# Patient Record
Sex: FEMALE | Race: WHITE | NOT HISPANIC OR LATINO | Employment: FULL TIME | URBAN - METROPOLITAN AREA
[De-identification: names, ages, dates, MRNs, and addresses within clinical notes are randomized per-mention and may not be internally consistent; named-entity substitution may affect disease eponyms.]

---

## 2017-09-06 ENCOUNTER — OFFICE VISIT (OUTPATIENT)
Dept: ALLERGY | Facility: CLINIC | Age: 57
End: 2017-09-06
Payer: COMMERCIAL

## 2017-09-06 VITALS
WEIGHT: 142.4 LBS | DIASTOLIC BLOOD PRESSURE: 84 MMHG | SYSTOLIC BLOOD PRESSURE: 122 MMHG | BODY MASS INDEX: 22.88 KG/M2 | HEIGHT: 66 IN | HEART RATE: 74 BPM | OXYGEN SATURATION: 98 %

## 2017-09-06 DIAGNOSIS — K21.9 GASTROESOPHAGEAL REFLUX DISEASE, ESOPHAGITIS PRESENCE NOT SPECIFIED: Primary | ICD-10-CM

## 2017-09-06 PROCEDURE — 99203 OFFICE O/P NEW LOW 30 MIN: CPT | Performed by: ALLERGY & IMMUNOLOGY

## 2017-09-06 RX ORDER — FEXOFENADINE HCL 60 MG/1
TABLET, FILM COATED ORAL PRN
COMMUNITY

## 2017-09-06 RX ORDER — ONDANSETRON 8 MG/1
8 TABLET, FILM COATED ORAL EVERY 8 HOURS PRN
Qty: 9 TABLET | Refills: 0 | COMMUNITY
Start: 2017-09-06

## 2017-09-06 NOTE — PROGRESS NOTES
"Maribel Gavin was seen in the Allergy Clinic at [facility]. The following are my recommendations regarding her GERD    1. Counseling and information provided regarding lifestyle and dietary modifications to manage acid reflux  2. Follow-up with PCP regarding ongoing medication management      Maribel Gavin is a 57 year old White female being seen today in consultation for food allergies. She has had a lot of GI symptoms and is wondering whether specific foods are contributing to these symptoms. Maribel reports symptoms of stomach discomfort or feeling \"icky\" along with chest discomfort and a \"mucusy cough.\" These symptoms occur after eating particularly foods that contain citrus fruits, tomatoes, spices, caffeine, and chocolate. She has since begun eliminating much of these foods from her diet. She has also noted that when she cooks her nose often runs. Maribel denies symptoms of hives or rash, swelling, difficulty swallowing, cough, difficulty breathing, nausea, vomiting, dizziness, or lightheadedness after eating any specific foods. Her diet is varied and she tries to eat a healthy diet full of \"whole foods.\" Maribel's diet includes wheat, dairy, oats, soy, eggs, peanuts, tree nuts, fruits, vegetables, meat, chicken, and fish.     Maribel has progressive osteoarthritis that has been managed by a rheumatologist. About 1 month ago she had an episode of vomiting that lasted 24 hours without diarrhea or other symptoms. She was diagnosed with gastritis and her plaquenil, celebrex, and other medications for her arthritis were discontinued. Maribel is concerned that food allergies may have played a role in causing her gastritis in combination with these medications. She was treated with PPI for 30 days and her symptoms improved but she has since discontinued the PPI.      PAST MEDICAL HISTORY:  Seasonal Allergies    Family History   Problem Relation Age of Onset     Cardiovascular Paternal " Grandmother       of an MI in her 80's; first MI in her 40's     CEREBROVASCULAR DISEASE Paternal Grandmother      Cardiovascular Paternal Uncle      3 uncles with MI's and ASHD     Lipids Father      High chol     Lipids Mother      high chol     Hypertension Paternal Uncle      Past Surgical History:   Procedure Laterality Date     COLONOSCOPY  3/7/2013    Procedure: COLONOSCOPY;  Colonoscopy;  Surgeon: Anupam Lopez MD;  Location: [facility]     COLONOSCOPY N/A 2016    Procedure: COLONOSCOPY;  Surgeon: Anupam Lopez MD;  Location: [facility]     HYSTERECTOMY, VAGINAL  2008    fibroids     SURGICAL HISTORY OF -       Laparoscopic     TONSILLECTOMY         ENVIRONMENTAL HISTORY: [place]. [place] is heated with a forced air and gas furnace. They does have central air conditioning. The patient's bedroom is furnished with hard vy in bedroom, allergen mattress cover and allergen pillowcase cover.  Pets inside the house include None. There is not history of cockroach or mice infestation. There is/are 0 smokers in the house.  The house does not have a damp basement. Currently cleaning basement after finding mold.     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 She lives with her spouse and 10 year old grandson.  Her spouse works as at [employer] modifying wheelchairs.    REVIEW OF SYSTEMS:  General: negative for weight gain. negative for weight loss. negative for changes in sleep.   Eyes: negative for itching. negative for redness. negative for tearing/watering.  Ears: negative for fullness. negative for hearing loss. negative for dizziness.   Nose: negative for snoring.negative for changes in smell. positive  for drainage.   Throat: negative for hoarseness. negative for sore throat. negative for trouble swallowing.   Lungs: negative for shortness of breath.negative for wheezing. negative for sputum  production.   Cardiovascular: negative for chest pain. positive  for swelling of ankles. negative for fast or irregular heartbeat.   Gastrointestinal: positive  for nausea. positive  for heartburn. positive  for acid reflux.   Musculoskeletal: positive  for joint pain. positive  for joint stiffness. positive  for joint swelling.   Neurologic: negative for seizures. negative for fainting. negative for weakness.   Psychiatric: negative for changes in mood. positive  for anxiety.   Endocrine: positive  for cold intolerance. positive  for heat intolerance. negative for tremors.   Hematologic: negative for easy bruising. negative for easy bleeding.  Integumentary: negative for rash. negative for scaling. negative for nail changes.       Current Outpatient Prescriptions:      TOPIRAMATE PO, Take 50 mg by mouth daily, Disp: , Rfl:      diclofenac (VOLTAREN) 1 % GEL, , Disp: , Rfl:      DOCOSAHEXAENOIC ACID PO, , Disp: , Rfl:      polyethylene glycol (MIRALAX) powder, 17 g, Disp: , Rfl:      InFLIXimab (REMICADE IV), , Disp: , Rfl:      Ondansetron HCl (ZOFRAN PO), Take 4 mg by mouth every 8 hours as needed for nausea or vomiting, Disp: , Rfl:      AZATHIOPRINE PO, Take 50 mg by mouth 4 times daily , Disp: , Rfl:      VITAMIN D, CHOLECALCIFEROL, PO, Take 5,000 Units by mouth daily, Disp: , Rfl:      UNABLE TO FIND, MEDICATION NAME: Krill oil, Disp: , Rfl:      eflornithine HCl 13.9 % CREA, Apply daily to chin, Disp: 45 g, Rfl: 11     clindamycin (CLEOCIN T) 1 % external solution, Apply topically 2 times daily, Disp: 180 mL, Rfl: 3     tretinoin (RETIN-A) 0.05 % cream, Spread a pea size amount into affected area topically at bedtime.  Use sunscreen SPF>20., Disp: 135 g, Rfl: 3     budesonide-formoterol (SYMBICORT) 80-4.5 MCG/ACT inhaler, Inhale 2 puffs into the lungs 2 times daily, Disp: 1 Inhaler, Rfl: 5     ipratropium (ATROVENT) 0.06 % nasal spray, Spray 2 sprays into both nostrils 4 times daily as needed for rhinitis,  "Disp: 3 Box, Rfl: 3     fluticasone (FLONASE) 50 MCG/ACT nasal spray, Spray 1-2 sprays into both nostrils daily., Disp: 1 Package, Rfl: 11     budesonide (PULMICORT) 0.5 MG/2ML nebulizer solution, 2 ml in 8 oz [facility] Sinus Rinse with water and salt packet both nostrils twice a day, Disp: 6 Box, Rfl: 1     CALCIUM CARBONATE-VITAMIN D PO, Take  by mouth. 1000 mg C and 1000icu D, Disp: , Rfl:   Immunization History   Administered Date(s) Administered     Influenza (IIV3) 10/30/2008     TDAP Vaccine (Adacel) 03/11/2010     Allergies   Allergen Reactions     Nkda [No Known Drug Allergies]          EXAM:   /84 (BP Location: Left arm, Patient Position: Chair, Cuff Size: Adult Regular)  Pulse 74  Ht 1.685 m (5' 6.34\")  Wt 64.6 kg (142 lb 6.4 oz)  SpO2 98%  BMI 22.75 kg/m2  GENERAL APPEARANCE: alert, cooperative and not in distress  SKIN: no rashes, no lesions  HEAD: atraumatic, normocephalic  EYES: lids and lashes normal, conjunctivae and sclerae clear, pupils equal, round, reactive to light, EOM full and intact  ENT: no scars or lesions, nasal exam showed no discharge, swelling or lesions noted, otoscopy showed external auditory canals clear, tympanic membranes normal, tongue midline and normal, soft palate, uvula, and tonsils normal  NECK: no asymmetry, masses, or scars, supple without significant adenopathy  LUNGS: unlabored respirations, no intercostal retractions or accessory muscle use, clear to auscultation without rales or wheezes  HEART: regular rate and rhythm without murmurs and normal S1 and S2  MUSCULOSKELETAL: no musculoskeletal defects are noted  NEURO: no focal deficits noted  PSYCH: does not appear depressed or anxious    WORKUP: None    ASSESSMENT/PLAN:  Maribel Gavin is a 57 year old female here for evaluation of possible food allergies. Maribel was counseled regarding the signs and symptoms of food allergies vs other adverse reactions to foods including sensitivities, intolerances, acid " reflux, and other immune reactions to foods. Her symptoms are not consistent with an IgE mediated reaction to foods. We discussed that skin prick or IgE testing would not be helpful in diagnosing or managing her current symptoms. Maribel was counseled that her symptoms are consistent with GERD and management consists of lifestyle modifications as well as medications if needed.    1. Counseling and information provided regarding lifestyle and dietary modifications to manage acid reflux  2. Follow-up with PCP regarding ongoing medication management      Norberto Ryder MD  Allergy/Immunology  [facility] and [place], [place]      Chart documentation done in part with Dragon Voice Recognition Software. Although reviewed after completion, some word and grammatical errors may remain.

## 2017-09-06 NOTE — MR AVS SNAPSHOT
After Visit Summary   9/6/2017    Maribel Gavin    MRN: [id number]           Patient Information     Date Of Birth          1960        Visit Information        Provider Department      9/6/2017 1:20 PM Norberto Ryder MD [facility]        Care Instructions    If you have any questions regarding your allergies, asthma, or what we discussed during your visit today please call the allergy clinic or contact us via [employer].    [facility] ([place], [place]): [phone]      Follow-up with your primary care doctor for management of acid reflux        Tips to Control Acid Reflux    To control acid reflux, you ll need to make some basic diet and lifestyle changes. The simple steps outlined below may be all you ll need to ease discomfort.  Watch what you eat    Avoid fatty foods and spicy foods.    Eat fewer acidic foods, such as citrus and tomato-based foods. These can increase symptoms.    Limit drinking alcohol, caffeine, and fizzy beverages. All increase acid reflux.    Try limiting chocolate, peppermint, and spearmint. These can worsen acid reflux in some people.  Watch when you eat    Avoid lying down for 3 hours after eating.    Do not snack before going to bed.  Raise your head  Raising your head and upper body by 4 to 6 inches helps limit reflux when you re lying down. Put blocks under the head of your bed frame to raise it.  Other changes    Lose weight, if you need to    Don t exercise near bedtime    Avoid tight-fitting clothes    Limit aspirin and ibuprofen    Stop smoking   Date Last Reviewed: 7/1/2016 2000-2017 [employer]. [address] [place] [address], [place] [address]. All rights reserved. This information is not intended as a substitute for professional medical care. Always follow your healthcare professional's instructions.        How Acid Reflux Affects Your Throat    Do you have to clear your throat or cough often? Are you hoarse? Do you have trouble  swallowing? If you have these or other throat symptoms, you may have acid reflux. This occurs when stomach acid flows back up and irritates your throat.  Why you have throat symptoms  There are muscles (esophageal sphincters) at both ends of the tube that carries food to your stomach (the esophagus). These muscles relax to let food pass. Then they tighten to keep stomach acid down. When the lower esophageal sphincter (LES) doesn t tighten enough, acid can flow back (reflux) from your stomach into your esophagus. This may cause heartburn. In some cases the upper esophageal sphincter (UES) also doesn t work well. Then acid can travel higher and enter your throat (pharynx). In many cases, this causes throat symptoms.  Common throat symptoms    Need to clear your throat often    Feeling like you re choking    Long-term (chronic) cough    Hoarseness    Trouble swallowing    Feel like you have a lump in your throat    Sour or acid taste    Sore throat that keeps coming back   Date Last Reviewed: 7/1/2016 2000-2017 [employer]. [address] [place] [address]. All rights reserved. This information is not intended as a substitute for professional medical care. Always follow your healthcare professional's instructions.        Discharge Instructions for Gastroesophageal Reflux Disease (GERD)  Gastroesophageal reflux disease (GERD) is a backflow of acid from the stomach into the swallowing tube (esophagus).  Home care  These home care steps can help you manage GERD:    Maintain a healthy weight. Get help to lose any extra pounds.    Avoid lying down after meals.    Avoid eating late at night.    Elevate the head of your bed by 6 inches. You can do this by placing wooden blocks or bed risers under the head of your bed.    Avoid wearing tight-fitting clothes.    Avoid foods that might irritate your stomach, such as the following:    Alcohol    Fat    Chocolate    Caffeine    Spearmint or  peppermint    Talk to your healthcare provider if you are taking any of the following medicines. These medicines can make GERD symptoms worse:    Calcium channel blockers    Theophylline    Anticholinergic medicines, such as oxybutynin and benzatropine    Begin an exercise program. Ask your healthcare provider how to get started. You can benefit from simple activities, such as walking or gardening.    Break the smoking habit. Enroll in a stop-smoking program to improve your chances of success.    Limit alcohol intake to no more than 2 drinks a day.    Take your medicines exactly as directed. Don t skip doses.    Avoid over-the-counter nonsteroidal anti-inflammatory medicines, such as aspirin and ibuprofen, unless recommended by your healthcare provider for certain conditions.     If possible, avoid nitrates (heart medicines, such as nitroglycerin and isosorbide dinitrate ).  Follow-up care  Make a follow-up appointment as directed by our staff.     When to call the healthcare provider  Call your healthcare provider immediately if you have any of the following:    Trouble swallowing    Pain when swallowing    Feeling of food caught in your chest or throat    Pain in the neck, chest, or back    Heartburn that causes you to vomit    Vomiting blood    Black or tarry stools (from digested blood)    More saliva (watering of the mouth) than usual    Weight loss of more than 3% to 5% of your total body weight in a month    Hoarseness or sore throat that won t go away    Choking, coughing, or wheezing   Date Last Reviewed: 7/1/2016 2000-2017 [employer]. [address] [place] [address], [place] [address]. All rights reserved. This information is not intended as a substitute for professional medical care. Always follow your healthcare professional's instructions.        GERD (Adult)    The esophagus is a tube that carries food from the mouth to the stomach. A valve at the lower end of the esophagus prevents stomach  "acid from flowing upward. When this valve doesn't work properly, stomach contents may repeatedly flow back up (reflux) into the esophagus. This is called gastroesophageal reflux disease (GERD). GERD can irritate the esophagus. It can cause problems with swallowing or breathing. In severe cases, GERD can cause recurrent pneumonia or other serious problems.  Symptoms of reflux include burning, pressure or sharp pain in the upper abdomen or mid to lower chest. The pain can spread to the neck, back, or shoulder. There may be belching, an acid taste in the back of the throat, chronic cough, or sore throat or hoarseness. GERD symptoms often occur during the day after a big meal. They can also occur at night when lying down.   Home care  Lifestyle changes can help reduce symptoms. If needed, medicines may be prescribed. Symptoms often improve with treatment, but if treatment is stopped, the symptoms often return after a few months. So most persons with GERD will need to continue treatment.  Lifestyle changes    Limit or avoid fatty, fried, and spicy foods, as well as coffee, chocolate, mint, and foods with high acid content such as tomatoes and citrus fruit and juices (orange, grapefruit, lemon).    Don t eat large meals, especially at night. Frequent, smaller meals are best. Do not lie down right after eating. And don t eat anything 3 hours before going to bed.    Avoid drinking alcohol and smoking. As much as possible, stay away from second hand smoke.    If you are overweight, losing weight will reduce symptoms.     Avoid wearing tight clothing around your stomach area.    If your symptoms occur during sleep, use a foam wedge to elevate your upper body (not just your head.) Or, place 4\" blocks under the head of your bed.  Medicines  If needed, medicines can help relieve the symptoms of GERD and prevent damage to the esophagus. Discuss a medicine plan with your healthcare provider. This may include one or more of the " following medicines:    Antacids to help neutralize the normal acids in your stomach.    Acid blockers (H2 blockers) to decrease acid production.    Acid inhibitors (PPIs) to decrease acid production in a different way than the blockers. They may work better, but can take a little longer to take effect.  Take an antacid 30-60 minutes after eating and at bedtime, but not at the same time as an acid blocker.  Try not to take medicines such as ibuprofen and aspirin. If you are taking aspirin for your heart or other medical reasons, talk to your healthcare provider about stopping it.  Follow-up care  Follow up with your healthcare provider or as advised by our staff.  When to seek medical advice  Call your healthcare provider if any of the following occur:    Stomach pain gets worse or moves to the lower right abdomen (appendix area)    Chest pain appears or gets worse, or spreads to the back, neck, shoulder, or arm    Frequent vomiting (can t keep down liquids)    Blood in the stool or vomit (red or black in color)    Feeling weak or dizzy    Fever of 100.4 F (38 C) or higher, or as directed by your healthcare provider  Date Last Reviewed: 6/23/2015 2000-2017 [employer]. [address] [place], [place] [id number]. All rights reserved. This information is not intended as a substitute for professional medical care. Always follow your healthcare professional's instructions.        Lifestyle Changes for Controlling GERD  When you have GERD, stomach acid feels as if it s backing up toward your mouth. Whether or not you take medicine to control your GERD, your symptoms can often be improved with lifestyle changes. Talk to your healthcare provider about the following suggestions. These suggestions may help you get relief from your symptoms.      Raise your head  Reflux is more likely to strike when you re lying down flat, because stomach fluid can flow backward more easily. Raising the head of your bed 4 to 6  inches can help. To do this:    Slide blocks or books under the legs at the head of your bed. Or, place a wedge under the mattress. Many foam stores can make a suitable wedge for you. The wedge should run from your waist to the top of your head.    Don t just prop your head on several pillows. This increases pressure on your stomach. It can make GERD worse.  Watch your eating habits  Certain foods may increase the acid in your stomach or relax the lower esophageal sphincter. This makes GERD more likely. It s best to avoid the following if they cause you symptoms:    Coffee, tea, and carbonated drinks (with and without caffeine)    Fatty, fried, or spicy food    Mint, chocolate, onions, and tomatoes    Peppermint    Any other foods that seem to irritate your stomach or cause you pain  Relieve the pressure  Tips include the following:    Eat smaller meals, even if you have to eat more often.    Don t lie down right after you eat. Wait a few hours for your stomach to empty.    Avoid tight belts and tight-fitting clothes.    Lose excess weight.  Tobacco and alcohol  Avoid smoking tobacco and drinking alcohol. They can make GERD symptoms worse.  Date Last Reviewed: 7/1/2016 2000-2017 [employer]. [address] [place] [address], [place] [address]. All rights reserved. This information is not intended as a substitute for professional medical care. Always follow your healthcare professional's instructions.                Follow-ups after your visit        Who to contact     If you have questions or need follow up information about today's clinic visit or your schedule please contact [facility] directly at [phone].  Normal or non-critical lab and imaging results will be communicated to you by MyChart, letter or phone within 4 business days after the clinic has received the results. If you do not hear from us within 7 days, please contact the clinic through MyChart or phone. If you have a critical  "or abnormal lab result, we will notify you by phone as soon as possible.  Submit refill requests through [employer] or call your pharmacy and they will forward the refill request to us. Please allow 3 business days for your refill to be completed.          Additional Information About Your Visit        [employer] Information     [employer] lets you send messages to your doctor, view your test results, renew your prescriptions, schedule appointments and more. To sign up, go to [email or web address] . Click on \"Log in\" on the left side of the screen, which will take you to the Welcome page. Then click on \"Sign up Now\" on the right side of the page.     You will be asked to enter the access code listed below, as well as some personal information. Please follow the directions to create your username and password.     Your access code is: [email or web address]  Expires: 2017  2:09 PM     Your access code will  in 90 days. If you need help or a new code, please call your [place] clinic or [phone].        Care EveryWhere ID     This is your Care EveryWhere ID. This could be used by other organizations to access your [place] medical records  [id number]        Your Vitals Were     Pulse Height Pulse Oximetry BMI (Body Mass Index)          74 5' 6.34\" (1.685 m) 98% 22.75 kg/m2         Blood Pressure from Last 3 Encounters:   17 122/84   16 118/75   16 137/81    Weight from Last 3 Encounters:   17 142 lb 6.4 oz (64.6 kg)   16 142 lb (64.4 kg)   10/15/14 130 lb (59 kg)              Today, you had the following     No orders found for display         Today's Medication Changes          These changes are accurate as of: 17  2:09 PM.  If you have any questions, ask your nurse or doctor.               These medicines have changed or have updated prescriptions.        Dose/Directions    ondansetron 8 MG tablet   Commonly known as:  ZOFRAN   This may have changed:  Another medication with the " same name was removed. Continue taking this medication, and follow the directions you see here.   Changed by:  Norberto Ryder MD        Dose:  8 mg   Take 1 tablet (8 mg) by mouth every 8 hours as needed for nausea   Quantity:  9 tablet   Refills:  0         Stop taking these medicines if you haven't already. Please contact your care team if you have questions.     budesonide 0.5 MG/2ML neb solution   Commonly known as:  PULMICORT   Stopped by:  Norberto Ryder MD           budesonide-formoterol 80-4.5 MCG/ACT Inhaler   Commonly known as:  SYMBICORT   Stopped by:  Norberto Ryder MD                    Primary Care Provider Office Phone # Fax #    [facility] [phone]       [address] [address]        Equal Access to Services     [facility] Center[facility] : Kei loza Solaure, waaxda luqadaha, qaybta kaalmadayron adams, kadeem dial . So [facility] [phone].    ATENCIÓN: Si habla español, tiene a smith disposición servicios gratuitos de asistencia lingüística. Oswaldo al [phone].    We comply with applicable federal civil rights laws and [place] laws. We do not discriminate on the basis of race, color, national origin, age, disability sex, sexual orientation or gender identity.            Thank you!     Thank you for choosing [facility]  for your care. Our goal is always to provide you with excellent care. Hearing back from our patients is one way we can continue to improve our services. Please take a few minutes to complete the written survey that you may receive in the mail after your visit with us. Thank you!             Your Updated Medication List - Protect others around you: Learn how to safely use, store and throw away your medicines at www.disposemymeds.org.          This list is accurate as of: 9/6/17  2:09 PM.  Always use your most recent med list.                   Brand Name Dispense Instructions for use  Diagnosis    AZATHIOPRINE PO      Take 50 mg by mouth 4 times daily        CALCIUM CARBONATE-VITAMIN D PO      Take  by mouth. 1000 mg C and 1000icu D    Osteoarthritis       clindamycin 1 % solution    CLEOCIN T    180 mL    Apply topically 2 times daily    Acne       DOCOSAHEXAENOIC ACID PO           eflornithine HCl 13.9 % Crea     45 g    Apply daily to chin    Excessive hair growth       fexofenadine 60 MG tablet    ALLEGRA     Take by mouth as needed        fluticasone 50 MCG/ACT spray    FLONASE    1 Package    Spray 1-2 sprays into both nostrils daily.    Allergic rhinitis, cause unspecified       MIRALAX powder   Generic drug:  polyethylene glycol      17 g        ondansetron 8 MG tablet    ZOFRAN    9 tablet    Take 1 tablet (8 mg) by mouth every 8 hours as needed for nausea        TOPIRAMATE PO      Take 50 mg by mouth daily        tretinoin 0.05 % cream    RETIN-A    135 g    Spread a pea size amount into affected area topically at bedtime.  Use sunscreen SPF>20.    Acne       * UNABLE TO FIND      MEDICATION NAME: Krill oil        * UNABLE TO FIND      MEDICATION NAME: Ground Flaxseed        * UNABLE TO FIND      MEDICATION NAME: Ground Bismark seed        VITAMIN D (CHOLECALCIFEROL) PO      Take 5,000 Units by mouth daily        VOLTAREN 1 % Gel topical gel   Generic drug:  diclofenac           * Notice:  This list has 3 medication(s) that are the same as other medications prescribed for you. Read the directions carefully, and ask your doctor or other care provider to review them with you.

## 2017-09-06 NOTE — PATIENT INSTRUCTIONS
If you have any questions regarding your allergies, asthma, or what we discussed during your visit today please call the allergy clinic or contact us via [employer].    [facility] ([place], [place]): [phone]      Follow-up with your primary care doctor for management of acid reflux        Tips to Control Acid Reflux    To control acid reflux, you ll need to make some basic diet and lifestyle changes. The simple steps outlined below may be all you ll need to ease discomfort.  Watch what you eat    Avoid fatty foods and spicy foods.    Eat fewer acidic foods, such as citrus and tomato-based foods. These can increase symptoms.    Limit drinking alcohol, caffeine, and fizzy beverages. All increase acid reflux.    Try limiting chocolate, peppermint, and spearmint. These can worsen acid reflux in some people.  Watch when you eat    Avoid lying down for 3 hours after eating.    Do not snack before going to bed.  Raise your head  Raising your head and upper body by 4 to 6 inches helps limit reflux when you re lying down. Put blocks under the head of your bed frame to raise it.  Other changes    Lose weight, if you need to    Don t exercise near bedtime    Avoid tight-fitting clothes    Limit aspirin and ibuprofen    Stop smoking   Date Last Reviewed: 7/1/2016 2000-2017 [employer]. [address] [place] [address], [place] [address]. All rights reserved. This information is not intended as a substitute for professional medical care. Always follow your healthcare professional's instructions.        How Acid Reflux Affects Your Throat    Do you have to clear your throat or cough often? Are you hoarse? Do you have trouble swallowing? If you have these or other throat symptoms, you may have acid reflux. This occurs when stomach acid flows back up and irritates your throat.  Why you have throat symptoms  There are muscles (esophageal sphincters) at both ends of the tube that carries food to your stomach (the  esophagus). These muscles relax to let food pass. Then they tighten to keep stomach acid down. When the lower esophageal sphincter (LES) doesn t tighten enough, acid can flow back (reflux) from your stomach into your esophagus. This may cause heartburn. In some cases the upper esophageal sphincter (UES) also doesn t work well. Then acid can travel higher and enter your throat (pharynx). In many cases, this causes throat symptoms.  Common throat symptoms    Need to clear your throat often    Feeling like you re choking    Long-term (chronic) cough    Hoarseness    Trouble swallowing    Feel like you have a lump in your throat    Sour or acid taste    Sore throat that keeps coming back   Date Last Reviewed: 7/1/2016 2000-2017 [employer]. [address] [place] [address], [place] [address]. All rights reserved. This information is not intended as a substitute for professional medical care. Always follow your healthcare professional's instructions.        Discharge Instructions for Gastroesophageal Reflux Disease (GERD)  Gastroesophageal reflux disease (GERD) is a backflow of acid from the stomach into the swallowing tube (esophagus).  Home care  These home care steps can help you manage GERD:    Maintain a healthy weight. Get help to lose any extra pounds.    Avoid lying down after meals.    Avoid eating late at night.    Elevate the head of your bed by 6 inches. You can do this by placing wooden blocks or bed risers under the head of your bed.    Avoid wearing tight-fitting clothes.    Avoid foods that might irritate your stomach, such as the following:    Alcohol    Fat    Chocolate    Caffeine    Spearmint or peppermint    Talk to your healthcare provider if you are taking any of the following medicines. These medicines can make GERD symptoms worse:    Calcium channel blockers    Theophylline    Anticholinergic medicines, such as oxybutynin and benzatropine    Begin an exercise program. Ask your healthcare  provider how to get started. You can benefit from simple activities, such as walking or gardening.    Break the smoking habit. Enroll in a stop-smoking program to improve your chances of success.    Limit alcohol intake to no more than 2 drinks a day.    Take your medicines exactly as directed. Don t skip doses.    Avoid over-the-counter nonsteroidal anti-inflammatory medicines, such as aspirin and ibuprofen, unless recommended by your healthcare provider for certain conditions.     If possible, avoid nitrates (heart medicines, such as nitroglycerin and isosorbide dinitrate ).  Follow-up care  Make a follow-up appointment as directed by our staff.     When to call the healthcare provider  Call your healthcare provider immediately if you have any of the following:    Trouble swallowing    Pain when swallowing    Feeling of food caught in your chest or throat    Pain in the neck, chest, or back    Heartburn that causes you to vomit    Vomiting blood    Black or tarry stools (from digested blood)    More saliva (watering of the mouth) than usual    Weight loss of more than 3% to 5% of your total body weight in a month    Hoarseness or sore throat that won t go away    Choking, coughing, or wheezing   Date Last Reviewed: 7/1/2016 2000-2017 [employer]. [address] [place] [address]. All rights reserved. This information is not intended as a substitute for professional medical care. Always follow your healthcare professional's instructions.        GERD (Adult)    The esophagus is a tube that carries food from the mouth to the stomach. A valve at the lower end of the esophagus prevents stomach acid from flowing upward. When this valve doesn't work properly, stomach contents may repeatedly flow back up (reflux) into the esophagus. This is called gastroesophageal reflux disease (GERD). GERD can irritate the esophagus. It can cause problems with swallowing or breathing. In severe cases, GERD can  "cause recurrent pneumonia or other serious problems.  Symptoms of reflux include burning, pressure or sharp pain in the upper abdomen or mid to lower chest. The pain can spread to the neck, back, or shoulder. There may be belching, an acid taste in the back of the throat, chronic cough, or sore throat or hoarseness. GERD symptoms often occur during the day after a big meal. They can also occur at night when lying down.   Home care  Lifestyle changes can help reduce symptoms. If needed, medicines may be prescribed. Symptoms often improve with treatment, but if treatment is stopped, the symptoms often return after a few months. So most persons with GERD will need to continue treatment.  Lifestyle changes    Limit or avoid fatty, fried, and spicy foods, as well as coffee, chocolate, mint, and foods with high acid content such as tomatoes and citrus fruit and juices (orange, grapefruit, lemon).    Don t eat large meals, especially at night. Frequent, smaller meals are best. Do not lie down right after eating. And don t eat anything 3 hours before going to bed.    Avoid drinking alcohol and smoking. As much as possible, stay away from second hand smoke.    If you are overweight, losing weight will reduce symptoms.     Avoid wearing tight clothing around your stomach area.    If your symptoms occur during sleep, use a foam wedge to elevate your upper body (not just your head.) Or, place 4\" blocks under the head of your bed.  Medicines  If needed, medicines can help relieve the symptoms of GERD and prevent damage to the esophagus. Discuss a medicine plan with your healthcare provider. This may include one or more of the following medicines:    Antacids to help neutralize the normal acids in your stomach.    Acid blockers (H2 blockers) to decrease acid production.    Acid inhibitors (PPIs) to decrease acid production in a different way than the blockers. They may work better, but can take a little longer to take " effect.  Take an antacid 30-60 minutes after eating and at bedtime, but not at the same time as an acid blocker.  Try not to take medicines such as ibuprofen and aspirin. If you are taking aspirin for your heart or other medical reasons, talk to your healthcare provider about stopping it.  Follow-up care  Follow up with your healthcare provider or as advised by our staff.  When to seek medical advice  Call your healthcare provider if any of the following occur:    Stomach pain gets worse or moves to the lower right abdomen (appendix area)    Chest pain appears or gets worse, or spreads to the back, neck, shoulder, or arm    Frequent vomiting (can t keep down liquids)    Blood in the stool or vomit (red or black in color)    Feeling weak or dizzy    Fever of 100.4 F (38 C) or higher, or as directed by your healthcare provider  Date Last Reviewed: 6/23/2015 2000-2017 [employer]. [address] [place] [address]. All rights reserved. This information is not intended as a substitute for professional medical care. Always follow your healthcare professional's instructions.        Lifestyle Changes for Controlling GERD  When you have GERD, stomach acid feels as if it s backing up toward your mouth. Whether or not you take medicine to control your GERD, your symptoms can often be improved with lifestyle changes. Talk to your healthcare provider about the following suggestions. These suggestions may help you get relief from your symptoms.      Raise your head  Reflux is more likely to strike when you re lying down flat, because stomach fluid can flow backward more easily. Raising the head of your bed 4 to 6 inches can help. To do this:    Slide blocks or books under the legs at the head of your bed. Or, place a wedge under the mattress. [employer] can make a suitable wedge for you. The wedge should run from your waist to the top of your head.    Don t just prop your head on several pillows.  This increases pressure on your stomach. It can make GERD worse.  Watch your eating habits  Certain foods may increase the acid in your stomach or relax the lower esophageal sphincter. This makes GERD more likely. It s best to avoid the following if they cause you symptoms:    Coffee, tea, and carbonated drinks (with and without caffeine)    Fatty, fried, or spicy food    Mint, chocolate, onions, and tomatoes    Peppermint    Any other foods that seem to irritate your stomach or cause you pain  Relieve the pressure  Tips include the following:    Eat smaller meals, even if you have to eat more often.    Don t lie down right after you eat. Wait a few hours for your stomach to empty.    Avoid tight belts and tight-fitting clothes.    Lose excess weight.  Tobacco and alcohol  Avoid smoking tobacco and drinking alcohol. They can make GERD symptoms worse.  Date Last Reviewed: 7/1/2016 2000-2017 [employer]. [address] [place] [address], [place] [address]. All rights reserved. This information is not intended as a substitute for professional medical care. Always follow your healthcare professional's instructions.

## 2017-09-06 NOTE — NURSING NOTE
"Chief Complaint   Patient presents with     Allergy Consult     Concerned with Food allergy, having stomach issues and nasal sx       Initial /84 (BP Location: Left arm, Patient Position: Chair, Cuff Size: Adult Regular)  Pulse 74  Ht 5' 6.34\" (1.685 m)  Wt 142 lb 6.4 oz (64.6 kg)  SpO2 98%  BMI 22.75 kg/m2 Estimated body mass index is 22.75 kg/(m^2) as calculated from the following:    Height as of this encounter: 5' 6.34\" (1.685 m).    Weight as of this encounter: 142 lb 6.4 oz (64.6 kg).  Medication Reconciliation: complete    "

## 2017-11-10 ENCOUNTER — HOSPITAL ENCOUNTER (EMERGENCY)
Facility: CLINIC | Age: 57
Discharge: HOME | End: 2017-11-10
Attending: PHYSICIAN ASSISTANT | Admitting: PHYSICIAN ASSISTANT
Payer: COMMERCIAL

## 2017-11-10 VITALS
OXYGEN SATURATION: 100 % | BODY MASS INDEX: 23.64 KG/M2 | SYSTOLIC BLOOD PRESSURE: 158 MMHG | DIASTOLIC BLOOD PRESSURE: 91 MMHG | WEIGHT: 148 LBS | RESPIRATION RATE: 18 BRPM | TEMPERATURE: 97.8 F

## 2017-11-10 DIAGNOSIS — J01.90 ACUTE SINUSITIS WITH SYMPTOMS > 10 DAYS: ICD-10-CM

## 2017-11-10 PROCEDURE — 99213 OFFICE O/P EST LOW 20 MIN: CPT

## 2017-11-10 PROCEDURE — 99213 OFFICE O/P EST LOW 20 MIN: CPT | Performed by: PHYSICIAN ASSISTANT

## 2017-11-10 NOTE — ED AVS SNAPSHOT
[facility]    [address] [facility] [address]    Phone:  [phone]    Fax:  [phone]                                       Maribel Gavin   MRN: [id number]    Department:  [facility]   Date of Visit:  11/10/2017           After Visit Summary Signature Page     I have received my discharge instructions, and my questions have been answered. I have discussed any challenges I see with this plan with the nurse or doctor.    ..........................................................................................................................................  Patient/Patient Representative Signature      ..........................................................................................................................................  Patient Representative Print Name and Relationship to Patient    ..................................................               ................................................  Date                                            Time    ..........................................................................................................................................  Reviewed by Signature/Title    ...................................................              ..............................................  Date                                                            Time

## 2017-11-10 NOTE — ED AVS SNAPSHOT
[facility]    [address] [facility] [address] [address]    Phone:  [phone]    Fax:  [phone]                                       Maribel Gavin   MRN: [id number]    Department:  [facility]   Date of Visit:  11/10/2017           Patient Information     Date Of Birth          1960        Your diagnoses for this visit were:     Acute sinusitis with symptoms > 10 days        You were seen by Victorina Stubbs PA-C.      Follow-up Information     Follow up with Daisy Silva MD In 3 days.    Specialty:  Family Practice    Why:  if no improvement or sooner if new or worsening symptoms     Contact information:    [facility]  [address] [address]  [phone]        24 Hour Appointment Hotline       To make an appointment at any [facility], call [phone] ([phone]). If you don't have a family doctor or clinic, we will help you find one. [facility] are conveniently located to serve the needs of you and your family.             Review of your medicines      START taking        Dose / Directions Last dose taken    amoxicillin-clavulanate 875-125 MG per tablet   Commonly known as:  AUGMENTIN   Dose:  1 tablet   Quantity:  14 tablet        Take 1 tablet by mouth 2 times daily for 7 days   Refills:  0          Our records show that you are taking the medicines listed below. If these are incorrect, please call your family doctor or clinic.        Dose / Directions Last dose taken    fexofenadine 60 MG tablet   Commonly known as:  ALLEGRA        Take by mouth as needed   Refills:  0        fluticasone 50 MCG/ACT spray   Commonly known as:  FLONASE   Dose:  1-2 spray   Quantity:  1 Package        Spray 1-2 sprays into both nostrils daily.   Refills:  11        MIRALAX powder   Dose:  17 g   Generic drug:  polyethylene glycol        17 g   Refills:  0        ondansetron 8 MG tablet   Commonly known as:   ZOFRAN   Dose:  8 mg   Quantity:  9 tablet        Take 1 tablet (8 mg) by mouth every 8 hours as needed for nausea   Refills:  0        * UNABLE TO FIND        MEDICATION NAME: Krill oil   Refills:  0        * UNABLE TO FIND        MEDICATION NAME: Ground Flaxseed   Refills:  0        * UNABLE TO FIND        MEDICATION NAME: Ground Bismark seed   Refills:  0        VOLTAREN 1 % Gel topical gel   Generic drug:  diclofenac        Refills:  0        * Notice:  This list has 3 medication(s) that are the same as other medications prescribed for you. Read the directions carefully, and ask your doctor or other care provider to review them with you.            Prescriptions were sent or printed at these locations (1 Prescription)                   [facility] [place], [place] - [address]   [address] [facility] [address]    Telephone:  [phone]   Fax:  [phone]   Hours:                  E-Prescribed (1 of 1)         amoxicillin-clavulanate (AUGMENTIN) 875-125 MG per tablet                Orders Needing Specimen Collection     None      Pending Results     No orders found from 11/8/2017 to 11/11/2017.            Pending Culture Results     No orders found from 11/8/2017 to 11/11/2017.            Pending Results Instructions     If you had any lab results that were not finalized at the time of your Discharge, you can call the ED Lab Result RN at [phone]. You will be contacted by this team for any positive Lab results or changes in treatment. The nurses are available 7 days a week from 10A to 6:30P.  You can leave a message 24 hours per day and they will return your call.        Test Results From Your Hospital Stay               Thank you for choosing [facility]       Thank you for choosing [facility] for your care. Our goal is always to provide you with excellent care. Hearing back from our patients is one way we can continue to improve our services. Please take a few minutes to complete the  "written survey that you may receive in the mail after you visit with us. Thank you!        [employer] Information     [employer] lets you send messages to your doctor, view your test results, renew your prescriptions, schedule appointments and more. To sign up, go to [email or web address] . Click on \"Log in\" on the left side of the screen, which will take you to the Welcome page. Then click on \"Sign up Now\" on the right side of the page.     You will be asked to enter the access code listed below, as well as some personal information. Please follow the directions to create your username and password.     Your access code is: [email or web address]  Expires: 2017  1:09 PM     Your access code will  in 90 days. If you need help or a new code, please call your [facility] clinic or [phone].        Care EveryWhere ID     This is your Care EveryWhere ID. This could be used by other organizations to access your [facility] medical records  [id number]        Equal Access to Services     ALAN WHITMAN : Hadhilda loza Solaure, waaxda luqadaha, qaybta kaalhowie adams, kadeem dial . So [facility] [phone].    ATENCIÓN: Si habla español, tiene a smith disposición servicios gratuitos de asistencia lingüística. Llame al [phone].    We comply with applicable federal civil rights laws and [place] laws. We do not discriminate on the basis of race, color, national origin, age, disability, sex, sexual orientation, or gender identity.            After Visit Summary       This is your record. Keep this with you and show to your community pharmacist(s) and doctor(s) at your next visit.                  "

## 2017-11-11 NOTE — ED PROVIDER NOTES
History     Chief Complaint   Patient presents with     URI     cough, ear pain, sore throat, fever     HPI  Maribel Gavin is a 57 year old female who presents to the urgent care with concerns over nasal congestion which is present for the last month.  Patient H initially developed nasal congestion, postnasal drainage which she attributed to allergies.  She attempts to treat with OTC antihistamine, sinus ridges without improvement.  Over the next several weeks she developed sore throat, cough, headache, sinus pressure.  She was evaluated at her primary care clinic last week for sore throat as she had a contact who recently had tested positive for strep, however her strep test and culture were negative. She was given 5 day course of prednisone which she took without significant improvement.  She states that after finishing medicine in 1-2 days ago her symptoms but dramatically worsened.  She now notes a subjective fever, chills, generalized body aches.  She denies any dyspnea, wheezing or new abdominal concerns.     Problem List:    Patient Active Problem List    Diagnosis Date Noted     Allergic rhinitis 07/23/2012     Priority: Medium     Intermittent asthma 07/23/2012     Priority: Medium     Osteoarthritis 06/14/2012     Priority: Medium     CARDIOVASCULAR SCREENING; LDL GOAL LESS THAN 160 10/31/2010     Priority: Medium     Neck pain 03/11/2010     Priority: Medium        Past Medical History:    No past medical history on file.    Past Surgical History:    Past Surgical History:   Procedure Laterality Date     COLONOSCOPY  3/7/2013    Procedure: COLONOSCOPY;  Colonoscopy;  Surgeon: Anupam Lopez MD;  Location: [facility]     COLONOSCOPY N/A 9/29/2016    Procedure: COLONOSCOPY;  Surgeon: Anupam Lopez MD;  Location: [facility]     HYSTERECTOMY, VAGINAL  2008    fibroids     SURGICAL HISTORY OF -   1995    Laparoscopic     TONSILLECTOMY  1970       Family History:    Family History   Problem Relation Age of  Onset     Cardiovascular Paternal Grandmother       of an MI in her 80's; first MI in her 40's     CEREBROVASCULAR DISEASE Paternal Grandmother      Cardiovascular Paternal Uncle      3 uncles with MI's and ASHD     Lipids Father      High chol     Lipids Mother      high chol     Hypertension Paternal Uncle      Social History:  Marital Status:   [2]  Social History   Substance Use Topics     Smoking status: Never Smoker     Smokeless tobacco: Never Used     Alcohol use Yes      Comment: occasional      Medications:      amoxicillin-clavulanate (AUGMENTIN) 875-125 MG per tablet   fexofenadine (ALLEGRA) 60 MG tablet   ondansetron (ZOFRAN) 8 MG tablet   diclofenac (VOLTAREN) 1 % GEL   polyethylene glycol (MIRALAX) powder   UNABLE TO FIND   UNABLE TO FIND   UNABLE TO FIND   fluticasone (FLONASE) 50 MCG/ACT nasal spray     Review of Systems  CONSTITUTIONAL:NEGATIVE for fever, chills, change in weight  INTEGUMENTARY/SKIN: NEGATIVE for worrisome rashes, moles or lesions  EYES: NEGATIVE for vision changes or irritation  ENT/MOUTH: POSITIVE for sore throat, sinus pressure, nasal congestion, post-nasal drainage and bilateral ear pressure   RESP:POSITIVE for cough NEGATIVE for dyspnea, wheezing   GI: NEGATIVE for abdominal pain, diarrhea, nausea and vomiting    Physical Exam   BP: (!) 158/91  Heart Rate: 103  Temp: 97.8  F (36.6  C)  Resp: 18  Weight: 67.1 kg (148 lb)  SpO2: 100 %    Physical Exam    GENERAL APPEARANCE: healthy, alert and no distress  EYES: EOMI,  PERRL, conjunctiva clear  HENT: ear canals and TM's normal.  His mucosa moist.  Tenderness to palpation bilateral maxillary, frontal, ethmoid sinuses.  Posterior pharynx is non-erythematous without exudate   NECK: supple, nontender, no lymphadenopathy  RESP: lungs clear to auscultation - no rales, rhonchi or wheezes  CV: regular rates and rhythm, normal S1 S2, no murmur noted  SKIN: no suspicious lesions or rashes  ED Course     ED Course      Procedures            Critical Care time:  none            Labs Ordered and Resulted from Time of ED Arrival Up to the Time of Departure from the ED - No data to display    Assessments & Plan (with Medical Decision Making)     I have reviewed the nursing notes.    I have reviewed the findings, diagnosis, plan and need for follow up with the patient.       New Prescriptions    AMOXICILLIN-CLAVULANATE (AUGMENTIN) 875-125 MG PER TABLET    Take 1 tablet by mouth 2 times daily for 7 days     Final diagnoses:   Acute sinusitis with symptoms > 10 days     57-year-old female presents to the urgent care with concern over one month history of nasal congestion, sinus pressure.  She was minimally tachycardic upon arrival, however heart rate had normalized at time of examination.  Physical exam findings as described above are consistent with sinusitis.  Given duration of symptoms worsening nature we'll cover for suspected bacterial sinusitis.  Differential would also include viral infection, allergic rhinitis, acid reflux contributing to sore throat.  Patient was discharged home stable pressure for Augmentin.  She was instructed to continue her OTC treatments.  Follow up with PCP if no improvement in 5-7 days.  Worrisome reasons to return to ER/UC sooner discussed.      Disclaimer: This note consists of symbols derived from keyboarding, dictation, and/or voice recognition software. As a result, there may be errors in the script that have gone undetected.  Please consider this when interpreting information found in the chart.    11/10/2017   [facility] EMERGENCY DEPARTMENT     Victorina Stubbs PA-C  11/12/17 1149

## 2017-12-08 ENCOUNTER — ANESTHESIA EVENT (OUTPATIENT)
Dept: GASTROENTEROLOGY | Facility: CLINIC | Age: 57
End: 2017-12-08
Payer: COMMERCIAL

## 2017-12-08 NOTE — ANESTHESIA PREPROCEDURE EVALUATION
Anesthesia Evaluation     . Pt has had prior anesthetic.     History of anesthetic complications   - motion sickness        ROS/MED HX    ENT/Pulmonary: Comment: TMJ    (+)allergic rhinitis, Intermittent asthma Treatment: Inhaler prn,  , . .    Neurologic:     (+)other neuro insomnia     Cardiovascular:     (+) Dyslipidemia, ----. : . . . :. . Previous cardiac testing Echodate:10/27/11results:Order     Echo stress test (ECH07) (Order [id number])      Exam Information     Exam Date Exam Time Accession # Results     10/27/11  8:19 AM [id number]      [employer] Interactive Report and InfoRx     View the interactive report     Component Results     Component    XCELERA       Interpretation Summary  The apex was not well visualized in the apical two chamber view but does   augment in the four chamber view. All other walls clearly augment. The   probability is that this is a normal stress echo with the previous caveats.  PatientHeight: 67 in  PatientWeight: 139 lbs  SystolicPressure: 106 mmHg  DiastolicPressure: 72 mmHg  HeartRate: 71 bpm  BSA 1.7 m^2     Baseline  Regional wall motion abnormalities cannot be excluded due to limited   visualization.  The visual ejection fraction is estimated at 55-60%.  The left ventricular apex is not well visualized.     Stress  Exercise and EKG portion reported separately.  Suboptimal image quality precludes assessment of wall motion abnormalities   with stress.  The visual ejection fraction is estimated at >70%.  Left ventricular cavity size decreases with exercise.  Global LV systolic function augments with exercise.  The apex was not well visualized in the apical two chamber view but does   augment in the four chamber view. All other walls clearly augment. The   probability is that this is a normal stress echo with the previous caveats.     Procedure  Stress Echo Complete.     Doppler Measurements & Calculations  TR Max P mmHg     Stress Results  Protocol:  Gamal Protocol     Target HR: 145 bpm            Maximum Predicted HR: 171 bpm      Stage        Duration(mm:ss)  HR(bpm)   BP        DOSE  Comment                    03:00          121       138/78                                     03:00          146       142/80                                     03:00          160       142/80                      PEAK           00:43          171       /               TERM. Leg fatigue,   ASYMPTOMATIC, RPP: 24,282, FAC: Above avg.  REC.           06:00          95        118/80                          Stress Duration: 09:43 mm:ss  Recovery Time: 00:00 mm:ss    Maximum Stress HR: 171 bpm    METS: 11      Interpreting Physician:  Fito Messer MD electronically signed on   10- 10:56:27    date: results: date: results: date: results:          METS/Exercise Tolerance:  >4 METS   Hematologic:  - neg hematologic  ROS       Musculoskeletal:   (+) arthritis, , , other musculoskeletal- CTS , spondylosis lumbar region       GI/Hepatic: Comment: Increased risk of aspiration due to GERD    (+) GERD Symptomatic, Other GI/Hepatic epigastric pain       Renal/Genitourinary:  - ROS Renal section negative       Endo:     (+) Other Endocrine Disorder prediabetes .      Psychiatric:  - neg psychiatric ROS       Infectious Disease:  - neg infectious disease ROS       Malignancy:      - no malignancy   Other:    (+) H/O Chronic Pain,                   Physical Exam  Normal systems: cardiovascular, pulmonary and dental    Airway   Mallampati: I  TM distance: >3 FB  Neck ROM: full    Dental     Cardiovascular       Pulmonary                     Anesthesia Plan      History & Physical Review      ASA Status:  2 .    NPO Status:  > 4 hours    Plan for MAC Reason for MAC:  Deep or markedly invasive procedure (G8)         Postoperative Care      Consents  Anesthetic plan, risks, benefits and alternatives discussed with:  Patient..                          .

## 2017-12-11 ENCOUNTER — ANESTHESIA (OUTPATIENT)
Dept: GASTROENTEROLOGY | Facility: CLINIC | Age: 57
End: 2017-12-11
Payer: COMMERCIAL

## 2017-12-11 ENCOUNTER — HOSPITAL ENCOUNTER (OUTPATIENT)
Facility: CLINIC | Age: 57
Discharge: HOME | End: 2017-12-11
Attending: SURGERY | Admitting: SURGERY
Payer: COMMERCIAL

## 2017-12-11 ENCOUNTER — SURGERY (OUTPATIENT)
Age: 57
End: 2017-12-11

## 2017-12-11 VITALS
WEIGHT: 147 LBS | TEMPERATURE: 98.1 F | OXYGEN SATURATION: 98 % | HEIGHT: 67 IN | HEART RATE: 69 BPM | RESPIRATION RATE: 16 BRPM | DIASTOLIC BLOOD PRESSURE: 70 MMHG | BODY MASS INDEX: 23.07 KG/M2 | SYSTOLIC BLOOD PRESSURE: 110 MMHG

## 2017-12-11 LAB — UPPER GI ENDOSCOPY: NORMAL

## 2017-12-11 PROCEDURE — 37000008 ZZH ANESTHESIA TECHNICAL FEE, 1ST 30 MIN: Performed by: SURGERY

## 2017-12-11 PROCEDURE — 88305 TISSUE EXAM BY PATHOLOGIST: CPT | Mod: 26 | Performed by: SURGERY

## 2017-12-11 PROCEDURE — 25000128 H RX IP 250 OP 636: Performed by: NURSE ANESTHETIST, CERTIFIED REGISTERED

## 2017-12-11 PROCEDURE — 88305 TISSUE EXAM BY PATHOLOGIST: CPT | Performed by: SURGERY

## 2017-12-11 PROCEDURE — 43239 EGD BIOPSY SINGLE/MULTIPLE: CPT | Performed by: SURGERY

## 2017-12-11 PROCEDURE — 25000125 ZZHC RX 250: Performed by: SURGERY

## 2017-12-11 RX ORDER — LIDOCAINE 40 MG/G
CREAM TOPICAL
Status: DISCONTINUED | OUTPATIENT
Start: 2017-12-11 | End: 2017-12-11 | Stop reason: HOSPADM

## 2017-12-11 RX ORDER — ONDANSETRON 2 MG/ML
4 INJECTION INTRAMUSCULAR; INTRAVENOUS
Status: DISCONTINUED | OUTPATIENT
Start: 2017-12-11 | End: 2017-12-11 | Stop reason: HOSPADM

## 2017-12-11 RX ORDER — SODIUM CHLORIDE, SODIUM LACTATE, POTASSIUM CHLORIDE, CALCIUM CHLORIDE 600; 310; 30; 20 MG/100ML; MG/100ML; MG/100ML; MG/100ML
INJECTION, SOLUTION INTRAVENOUS CONTINUOUS
Status: DISCONTINUED | OUTPATIENT
Start: 2017-12-11 | End: 2017-12-11 | Stop reason: HOSPADM

## 2017-12-11 RX ORDER — PROPOFOL 10 MG/ML
INJECTION, EMULSION INTRAVENOUS PRN
Status: DISCONTINUED | OUTPATIENT
Start: 2017-12-11 | End: 2017-12-11

## 2017-12-11 RX ADMIN — PROPOFOL 150 MG: 10 INJECTION, EMULSION INTRAVENOUS at 10:12

## 2017-12-11 RX ADMIN — PROPOFOL 50 MG: 10 INJECTION, EMULSION INTRAVENOUS at 10:15

## 2017-12-11 RX ADMIN — BENZOCAINE 2 SPRAY: 220 SPRAY, METERED PERIODONTAL at 10:12

## 2017-12-11 NOTE — ANESTHESIA POSTPROCEDURE EVALUATION
Patient: Maribel Gavin    Procedure(s):  Gastroscopy   - Wound Class: II-Clean Contaminated    Diagnosis:Epigastric pain, REFLUX  Diagnosis Additional Information: No value filed.    Anesthesia Type:  General, MAC    Note:  Anesthesia Post Evaluation    Patient location during evaluation: Bedside  Patient participation: Able to fully participate in evaluation  Level of consciousness: awake and alert  Pain management: adequate  Airway patency: patent  Cardiovascular status: acceptable  Respiratory status: acceptable  Hydration status: acceptable  PONV: none     Anesthetic complications: None          Last vitals:  Vitals:    12/11/17 0909   BP: 118/73   Pulse: 69   Temp: 36.7  C (98.1  F)   SpO2: 98%         Electronically Signed By: JOVANNY Montesinos CRNA  December 11, 2017  10:20 AM

## 2017-12-11 NOTE — H&P
"[facility]  GI Pre-Procedure History & Physical      Name: Maribel Gavin MRN#: [id number]   Age: 57 year old YOB: 1960     Date of Procedure: 12/11/2017    Primary care provider: Víctor Dunn [place]      Reason for Procedure:   Screening (V76.51), Esophageal reflux (530.81)    Problem List:   See a copy of the patient s current problem list from [facility].  I have reviewed this document and have no additions or corrections.    Current Outpatient Medications:      No current outpatient prescriptions on file.        Allergies:      Allergies   Allergen Reactions     Nkda [No Known Drug Allergies]         History:   See a copy of the patient s current past history from [facility].  I have reviewed this document and have no additions or corrections.    Physical Exam:   Vital Signs:  /73  Pulse 69  Temp 98.1  F (36.7  C) (Oral)  Ht 1.702 m (5' 7\")  Wt 66.7 kg (147 lb)  SpO2 98%  Breastfeeding? No  BMI 23.02 kg/m2  Airway assessment:  Patient is able to open mouth wide  Patient is able to stick out tongue    ASA:  2  Mallampati Score: 2     Lungs:  No increased work of breathing, good air exchange, clear to auscultation bilaterally, no crackles or wheezing.   Cardiovascular:  Normal- regular rate and rhythm, normal S1 - S2, no S3 or S4, and no murmur noted.  Gastrointestinal:  Abdomen soft, non-tender.  BS normal. No masses, organomegaly.        Assessment:   Appropriately NPO.  No significant changes since H&P.    Plan:   Moderate (conscious) sedation     General and specific risks of the procedure of the procedure including pain, bleeding, and perforation were discussed. Possibility of missing lesions discussed. Prep and recovery were discussed. She asked appropriate questions and provided consent.      Patient's active problems diagnostically and therapeutically optimized for the planned procedure. Risks, benefits, alternatives to sedation and blood explained and consent obtained.  " Risks, benefits, alternatives to procedure explained and consent obtained.    I have reviewed the history and physical, lab finding(s), diagnostic data, medications, and the plan for sedation.  I have determined this patient to be an appropriate candidate for the planned sedation/procedure and have reassessed the patient immediately prior to sedation/procedure.    Anupam Lopez MD

## 2017-12-14 LAB — COPATH REPORT: NORMAL

## 2018-05-21 ENCOUNTER — OFFICE VISIT (OUTPATIENT)
Dept: ALLERGY | Facility: CLINIC | Age: 58
End: 2018-05-21
Payer: COMMERCIAL

## 2018-05-21 VITALS
WEIGHT: 141 LBS | DIASTOLIC BLOOD PRESSURE: 70 MMHG | TEMPERATURE: 97.1 F | SYSTOLIC BLOOD PRESSURE: 113 MMHG | RESPIRATION RATE: 16 BRPM | OXYGEN SATURATION: 98 % | BODY MASS INDEX: 22.13 KG/M2 | HEART RATE: 77 BPM | HEIGHT: 67 IN

## 2018-05-21 DIAGNOSIS — R06.09 OTHER FORM OF DYSPNEA: Primary | ICD-10-CM

## 2018-05-21 DIAGNOSIS — J31.0 OTHER CHRONIC RHINITIS: ICD-10-CM

## 2018-05-21 LAB
FEF 25/75: NORMAL
FEV-1: NORMAL
FEV1/FVC: NORMAL
FVC: NORMAL

## 2018-05-21 PROCEDURE — 94010 BREATHING CAPACITY TEST: CPT | Performed by: ALLERGY & IMMUNOLOGY

## 2018-05-21 PROCEDURE — 99214 OFFICE O/P EST MOD 30 MIN: CPT | Mod: 25 | Performed by: ALLERGY & IMMUNOLOGY

## 2018-05-21 RX ORDER — LORATADINE 10 MG/1
10 TABLET ORAL DAILY
COMMUNITY

## 2018-05-21 RX ORDER — AZELASTINE 1 MG/ML
2 SPRAY, METERED NASAL 2 TIMES DAILY PRN
Qty: 30 ML | Refills: 3 | Status: SHIPPED | OUTPATIENT
Start: 2018-05-21

## 2018-05-21 RX ORDER — ALBUTEROL SULFATE 90 UG/1
2 AEROSOL, METERED RESPIRATORY (INHALATION) EVERY 6 HOURS
COMMUNITY

## 2018-05-21 RX ORDER — SENNOSIDES 8.6 MG
1 TABLET ORAL DAILY PRN
COMMUNITY

## 2018-05-21 RX ORDER — CETIRIZINE HYDROCHLORIDE 10 MG/1
10 TABLET ORAL DAILY
COMMUNITY

## 2018-05-21 ASSESSMENT — ENCOUNTER SYMPTOMS
ACTIVITY CHANGE: 1
FACIAL SWELLING: 0
ADENOPATHY: 0
NAUSEA: 1
CHEST TIGHTNESS: 1
RHINORRHEA: 1
SINUS PRESSURE: 0
MYALGIAS: 0
VOMITING: 0
WHEEZING: 0
DIARRHEA: 0
JOINT SWELLING: 1
SHORTNESS OF BREATH: 1
ARTHRALGIAS: 1
COUGH: 0
HEADACHES: 0
EYE DISCHARGE: 0
EYE ITCHING: 0
EYE REDNESS: 0
CHILLS: 0
FEVER: 1

## 2018-05-21 NOTE — LETTER
5/21/2018         RE: Maribel Gavin  [address] [place] [address]        Dear Colleague,    Thank you for referring your patient, Maribel Gavin, to the [facility]. Please see a copy of my visit note below.    SUBJECTIVE:                                                               Maribel Gavin presents today to our Allergy Clinic at [facility] for a new patient visit.  She is a 58-year-old female with history of intermittent asthma and allergic rhinitis.  She was a patient of Dr. Carrillo, Renea Mckeon and Dr. Ryder.  Percutaneous skin puncture testing for aeroallergens performed in 2012 showed sensitivity to cat only.  Intradermal testing with possible sensitivity to dust mite, molds, tree, and weed pollen.  The patient was on allergen immunotherapy from 2012 until 2014. She thinks it wasn't helpful.  She had daily postnasal drainage, clear rhinoirrhea and aural fullness. Uses intranasal fluticasone on a daily basis, 1 spray/nostril twice daily. She may use Benadryl, and sinus rinses as needed. On this regimen she is close 100% symptom control.     She was diagnosed with asthma by Dr. Carrillo.    On my review, prebronchodilator office spirometry with borderline small airway limitation.  FEF 25-75% with 27% improvement in FEF 25-75, which I would not necessarily interpret as reversible. She had PFTs in 2013 that showed reversibility in FEF 25-75% by 33%. At that time, baseline FEF 25-75% was already 92%    Per Renea Mckeon's note, the patient had difficulty breathing and albuterol did not provide symptom relief. The patient admits that. She feels that she needs to take a deep breath, and she can't. It may last sveral hours and then it resolves usually after being able to take a deep breath. She also develops an urge to yawn and she can't. Sometimes, she may have chest tightness and cough, but she attributes it to her mucus. Albuterol doesn't seem to help  with her respiratory problems.  Exertion does not make it worse.      Patient Active Problem List   Diagnosis     Neck pain     CARDIOVASCULAR SCREENING; LDL GOAL LESS THAN 160     Osteoarthritis     Allergic rhinitis     Intermittent asthma       Past Medical History:   Diagnosis Date     Degenerative arthritis     Advanced per pt      Problem (# of Occurrences) Relation (Name,Age of Onset)    CEREBROVASCULAR DISEASE (1) Paternal Grandmother    Cardiovascular (2) Paternal Grandmother:  of an MI in her 80's; first MI in her 40's, Paternal Uncle: 3 uncles with MI's and ASHD    Hypertension (1) Paternal Uncle    Lipids (2) Father: High chol, Mother: high chol    Macular Degeneration (1) Mother        Past Surgical History:   Procedure Laterality Date     COLONOSCOPY  3/7/2013    Procedure: COLONOSCOPY;  Colonoscopy;  Surgeon: Anupam Lopez MD;  Location: [facility]     COLONOSCOPY N/A 2016    Procedure: COLONOSCOPY;  Surgeon: Anupam Lopez MD;  Location: [facility]     ESOPHAGOSCOPY, GASTROSCOPY, DUODENOSCOPY (EGD), COMBINED N/A 2017    Procedure: COMBINED ESOPHAGOSCOPY, GASTROSCOPY, DUODENOSCOPY (EGD), BIOPSY SINGLE OR MULTIPLE;  Gastroscopy  ;  Surgeon: Anupam Lopez MD;  Location: [facility]     FOOT SURGERY       HYSTERECTOMY, VAGINAL  2008    fibroids     knuckle replacements       SURGICAL HISTORY OF -       Laparoscopic     TONSILLECTOMY  1970     Social History     Social History     Marital status:      Spouse name: N/A     Number of children: N/A     Years of education: N/A     Occupational History      [employer]     Social History Main Topics     Smoking status: Never Smoker     Smokeless tobacco: Never Used     Alcohol use Yes      Comment: occasional     Drug use: No     Sexual activity: Not Asked     Other Topics Concern     None     Social History Narrative    May 21, 2018        ENVIRONMENTAL HISTORY: [place] The home is heated  with a gas furnace. They does have central air conditioning. The patient's bedroom is furnished with hard vy in bedroom, allergen mattress cover and allergen pillowcase cover.  Pets inside the house include 1 dog. There is no history of cockroach or mice infestation. There is 1 smoker in the house, smokes outside.  The house does not have a damp basement.                Review of Systems   Constitutional: Positive for activity change and fever. Negative for chills.        Due to surgeries   HENT: Positive for postnasal drip and rhinorrhea. Negative for congestion, dental problem, ear pain, facial swelling, nosebleeds, sinus pressure and sneezing.    Eyes: Negative for discharge, redness and itching.   Respiratory: Positive for chest tightness and shortness of breath. Negative for cough and wheezing.    Cardiovascular: Negative for chest pain.   Gastrointestinal: Positive for nausea. Negative for diarrhea and vomiting.   Musculoskeletal: Positive for arthralgias and joint swelling. Negative for myalgias.   Skin: Negative for rash.   Neurological: Negative for headaches.   Hematological: Negative for adenopathy.   Psychiatric/Behavioral: Negative for behavioral problems and self-injury.           Current Outpatient Prescriptions:      albuterol (PROAIR HFA/PROVENTIL HFA/VENTOLIN HFA) 108 (90 Base) MCG/ACT Inhaler, Inhale 2 puffs into the lungs every 6 hours, Disp: , Rfl:      azelastine (ASTELIN) 0.1 % spray, Spray 2 sprays into both nostrils 2 times daily as needed, Disp: 30 mL, Rfl: 3     Black Pepper-Turmeric (TURMERIC COMPLEX/BLACK PEPPER PO), Take by mouth 2 times daily, Disp: , Rfl:      cetirizine (ZYRTEC) 10 MG tablet, Take 10 mg by mouth daily, Disp: , Rfl:      DiphenhydrAMINE HCl (BENADRYL PO), Take by mouth daily as needed, Disp: , Rfl:      fexofenadine (ALLEGRA) 60 MG tablet, Take by mouth as needed, Disp: , Rfl:      fluticasone (FLONASE) 50 MCG/ACT nasal spray, Spray 1-2 sprays into both nostrils  "daily., Disp: 1 Package, Rfl: 11     loratadine (CLARITIN) 10 MG tablet, Take 10 mg by mouth daily, Disp: , Rfl:      ondansetron (ZOFRAN) 8 MG tablet, Take 1 tablet (8 mg) by mouth every 8 hours as needed for nausea, Disp: 9 tablet, Rfl: 0     sennosides (SENOKOT) 8.6 MG tablet, Take 1 tablet by mouth daily as needed for constipation, Disp: , Rfl:      diclofenac (VOLTAREN) 1 % GEL, , Disp: , Rfl:      polyethylene glycol (MIRALAX) powder, 17 g, Disp: , Rfl:      UNABLE TO FIND, MEDICATION NAME: Krill oil, Disp: , Rfl:      UNABLE TO FIND, MEDICATION NAME: Ground Flaxseed, Disp: , Rfl:      UNABLE TO FIND, MEDICATION NAME: Ground Bismark seed, Disp: , Rfl:   Immunization History   Administered Date(s) Administered     Influenza (IIV3) PF 10/30/2008     TDAP Vaccine (Adacel) 03/11/2010     Allergies   Allergen Reactions     Nkda [No Known Drug Allergies]      OBJECTIVE:                                                                 /70 (BP Location: Left arm, Patient Position: Sitting, Cuff Size: Adult Regular)  Pulse 77  Temp 97.1  F (36.2  C) (Tympanic)  Resp 16  Ht 1.702 m (5' 7\")  Wt 64 kg (141 lb)  SpO2 98%  BMI 22.08 kg/m2        Physical Exam   Constitutional: She is oriented to person, place, and time. No distress.   HENT:   Head: Normocephalic and atraumatic.   Right Ear: Tympanic membrane, external ear and ear canal normal.   Left Ear: Tympanic membrane, external ear and ear canal normal.   Nose: No mucosal edema or rhinorrhea.   Mouth/Throat: Oropharynx is clear and moist and mucous membranes are normal. No oropharyngeal exudate, posterior oropharyngeal edema or posterior oropharyngeal erythema.   Eyes: Conjunctivae are normal. Right eye exhibits no discharge. Left eye exhibits no discharge.   Neck: Normal range of motion.   Cardiovascular: Normal rate, regular rhythm and normal heart sounds.    No murmur heard.  Pulmonary/Chest: Effort normal and breath sounds normal. No respiratory distress. " She has no wheezes. She has no rales.   Musculoskeletal: Normal range of motion.   Neurological: She is alert and oriented to person, place, and time.   Skin: Skin is warm. No rash noted. She is not diaphoretic.   Psychiatric: Affect normal.   Nursing note and vitals reviewed.    WORKUP:   SPIROMETRY       FVC 3.77L (103% of predicted).     FEV1 2.89L (100% of predicted).     FEV1/FVC 77%     FEF 25%-75%  2.36L/s (90% of predicted)    The office spirometry performed today does not suggest an obstruction.      Asthma Control Test (ACT) total score: 21     Percutaneous skin puncture testing for aeroallergens performed today (May 21, 2018) was negative with appropriate responses to positive and negative controls.      ASSESSMENT/PLAN:       1. Other form of dyspnea    -  Primary  Reactive airway versus dysfunctional breathing.  Bronchodilators do not seem to provide a good symptom control.  -Ordered PFTs and methacholine challenge test.  Discussed possibility of false positive results.  If negative, will refer for evaluation at [facility].    Other Relevant Orders    Spirometry, Breathing Capacity (Completed)    General PFT Lab (Please always keep checked)    Pulmonary Function Test    CBC with platelets differential    XR Chest 2 Views    2. Other chronic rhinitis    It seems to be relatively well controlled with intranasal fluticasone 1 spray in each nostril twice daily, sinus rinses as needed and diphenhydramine as needed.  -Continue as is.  For breakthrough symptoms, she may also consider azelastine 2 sprays in each nostril twice daily as needed.      Relevant Medications    azelastine (ASTELIN) 0.1 % spray         Follow up depending on the results of methacholine challenge test.  Sooner if it is needed.    Thank you for allowing us to participate in the care of this patient. Please feel free to contact us if there are any questions or concerns about the patient.    Disclaimer: This note consists of symbols  derived from keyboarding, dictation and/or voice recognition software. As a result, there may be errors in the script that have gone undetected. Please consider this when interpreting information found in this chart.    Mike Greer MD, [facility]  Allergy, Asthma and Immunology  [place], [place] and Jak Garcia      Again, thank you for allowing me to participate in the care of your patient.        Sincerely,        Mike Greer MD

## 2018-05-21 NOTE — PATIENT INSTRUCTIONS
Continue using intranasal fluticasone 1 spray in each nostril once-twice daily.  Try azelastine 2 sprays in each nostril twice daily as needed.    Get chest x-ray done.    Call to schedule PFTs and methacholine challenge test:    [phone]

## 2018-05-21 NOTE — MR AVS SNAPSHOT
After Visit Summary   5/21/2018    Maribel Gavin    MRN: [id number]           Patient Information     Date Of Birth          1960        Visit Information        Provider Department      5/21/2018 1:20 PM Mike Greer MD [facility]        Today's Diagnoses     Other chronic rhinitis    -  1    Other form of dyspnea          Care Instructions    Continue using intranasal fluticasone 1 spray in each nostril once-twice daily.  Try azelastine 2 sprays in each nostril twice daily as needed.    Get chest x-ray done.    Call to schedule PFTs and methacholine challenge test:    [phone]          Follow-ups after your visit        Future tests that were ordered for you today     Open Future Orders        Priority Expected Expires Ordered    XR Chest 2 Views Routine 5/21/2018 5/21/2019 5/21/2018    CBC with platelets differential Routine  5/21/2019 5/21/2018    General PFT Lab (Please always keep checked) Routine  5/21/2019 5/21/2018    Pulmonary Function Test Routine  5/21/2019 5/21/2018            Who to contact     If you have questions or need follow up information about today's clinic visit or your schedule please contact [facility] directly at [phone].  Normal or non-critical lab and imaging results will be communicated to you by [employer], letter or phone within 4 business days after the clinic has received the results. If you do not hear from us within 7 days, please contact the clinic through [employer] or phone. If you have a critical or abnormal lab result, we will notify you by phone as soon as possible.  Submit refill requests through [employer] or call your pharmacy and they will forward the refill request to us. Please allow 3 business days for your refill to be completed.          Additional Information About Your Visit        [employer] Information     [employer] lets you send messages to your doctor, view your test results, renew your prescriptions, schedule  "appointments and more. To sign up, go to [email or web address] . Click on \"Log in\" on the left side of the screen, which will take you to the Welcome page. Then click on \"Sign up Now\" on the right side of the page.     You will be asked to enter the access code listed below, as well as some personal information. Please follow the directions to create your username and password.     Your access code is: [email or web address]  Expires: 2018  2:52 PM     Your access code will  in 90 days. If you need help or a new code, please call your [place] clinic or [phone].        Care EveryWhere ID     This is your Care EveryWhere ID. This could be used by other organizations to access your [place] medical records  [id number]        Your Vitals Were     Pulse Temperature Respirations Height Pulse Oximetry BMI (Body Mass Index)    77 97.1  F (36.2  C) (Tympanic) 16 1.702 m (5' 7\") 98% 22.08 kg/m2       Blood Pressure from Last 3 Encounters:   18 113/70   17 110/70   11/10/17 (!) 158/91    Weight from Last 3 Encounters:   18 64 kg (141 lb)   17 66.7 kg (147 lb)   11/10/17 67.1 kg (148 lb)              We Performed the Following     Spirometry, Breathing Capacity        Primary Care Provider Office Phone # Fax #    [facility] [phone] [address] [facility] [address]        Equal Access to Services     [facility]: Hadhilda loza Solaure, waaxda luqadaha, qaybta kaalmakadeem mcqueen. So [facility] [phone].    ATENCIÓN: Si habla español, tiene a smith disposición servicios gratuitos de asistencia lingüística. Llame al [phone].    We comply with applicable federal civil rights laws and [place] laws. We do not discriminate on the basis of race, color, national origin, age, disability, sex, sexual orientation, or gender identity.            Thank you!     Thank you for choosing [facility]" for your care. Our goal is always to provide you with excellent care. Hearing back from our patients is one way we can continue to improve our services. Please take a few minutes to complete the written survey that you may receive in the mail after your visit with us. Thank you!             Your Updated Medication List - Protect others around you: Learn how to safely use, store and throw away your medicines at www.disposemymeds.org.          This list is accurate as of 5/21/18  2:53 PM.  Always use your most recent med list.                   Brand Name Dispense Instructions for use Diagnosis    albuterol 108 (90 Base) MCG/ACT Inhaler    PROAIR HFA/PROVENTIL HFA/VENTOLIN HFA     Inhale 2 puffs into the lungs every 6 hours        BENADRYL PO      Take by mouth daily as needed        cetirizine 10 MG tablet    zyrTEC     Take 10 mg by mouth daily        fexofenadine 60 MG tablet    ALLEGRA     Take by mouth as needed        fluticasone 50 MCG/ACT spray    FLONASE    1 Package    Spray 1-2 sprays into both nostrils daily.    Allergic rhinitis, cause unspecified       loratadine 10 MG tablet    CLARITIN     Take 10 mg by mouth daily        MIRALAX powder   Generic drug:  polyethylene glycol      17 g        ondansetron 8 MG tablet    ZOFRAN    9 tablet    Take 1 tablet (8 mg) by mouth every 8 hours as needed for nausea        sennosides 8.6 MG tablet    SENOKOT     Take 1 tablet by mouth daily as needed for constipation        TURMERIC COMPLEX/BLACK PEPPER PO      Take by mouth 2 times daily        * UNABLE TO FIND      MEDICATION NAME: Krill oil        * UNABLE TO FIND      MEDICATION NAME: Ground Flaxseed        * UNABLE TO FIND      MEDICATION NAME: Ground Bismark seed        VOLTAREN 1 % Gel topical gel   Generic drug:  diclofenac           * Notice:  This list has 3 medication(s) that are the same as other medications prescribed for you. Read the directions carefully, and ask your doctor or other care provider to  review them with you.

## 2018-05-21 NOTE — PROGRESS NOTES
SUBJECTIVE:                                                               Maribel Gavin presents today to our Allergy Clinic at [facility] for a new patient visit.  She is a 58-year-old female with history of intermittent asthma and allergic rhinitis.  She was a patient of Dr. Carrillo, Renea Mckeon and Dr. Rydre.  Percutaneous skin puncture testing for aeroallergens performed in 2012 showed sensitivity to cat only.  Intradermal testing with possible sensitivity to dust mite, molds, tree, and weed pollen.  The patient was on allergen immunotherapy from 2012 until 2014. She thinks it wasn't helpful.  She had daily postnasal drainage, clear rhinoirrhea and aural fullness. Uses intranasal fluticasone on a daily basis, 1 spray/nostril twice daily. She may use Benadryl, and sinus rinses as needed. On this regimen she is close 100% symptom control.     She was diagnosed with asthma by Dr. Carrillo.    On my review, prebronchodilator office spirometry with borderline small airway limitation.  FEF 25-75% with 27% improvement in FEF 25-75, which I would not necessarily interpret as reversible. She had PFTs in 2013 that showed reversibility in FEF 25-75% by 33%. At that time, baseline FEF 25-75% was already 92%    Per Renea Mckeon's note, the patient had difficulty breathing and albuterol did not provide symptom relief. The patient admits that. She feels that she needs to take a deep breath, and she can't. It may last sveral hours and then it resolves usually after being able to take a deep breath. She also develops an urge to yawn and she can't. Sometimes, she may have chest tightness and cough, but she attributes it to her mucus. Albuterol doesn't seem to help with her respiratory problems.  Exertion does not make it worse.      Patient Active Problem List   Diagnosis     Neck pain     CARDIOVASCULAR SCREENING; LDL GOAL LESS THAN 160     Osteoarthritis     Allergic rhinitis     Intermittent asthma        Past Medical History:   Diagnosis Date     Degenerative arthritis     Advanced per pt      Problem (# of Occurrences) Relation (Name,Age of Onset)    CEREBROVASCULAR DISEASE (1) Paternal Grandmother    Cardiovascular (2) Paternal Grandmother:  of an MI in her 80's; first MI in her 40's, Paternal Uncle: 3 uncles with MI's and ASHD    Hypertension (1) Paternal Uncle    Lipids (2) Father: High chol, Mother: high chol    Macular Degeneration (1) Mother        Past Surgical History:   Procedure Laterality Date     COLONOSCOPY  3/7/2013    Procedure: COLONOSCOPY;  Colonoscopy;  Surgeon: Anupam Lopez MD;  Location: [facility]     COLONOSCOPY N/A 2016    Procedure: COLONOSCOPY;  Surgeon: Anupam Lopez MD;  Location: [facility]     ESOPHAGOSCOPY, GASTROSCOPY, DUODENOSCOPY (EGD), COMBINED N/A 2017    Procedure: COMBINED ESOPHAGOSCOPY, GASTROSCOPY, DUODENOSCOPY (EGD), BIOPSY SINGLE OR MULTIPLE;  Gastroscopy  ;  Surgeon: Anupam Lopez MD;  Location: [facility]     FOOT SURGERY       HYSTERECTOMY, VAGINAL  2008    fibroids     knuckle replacements       SURGICAL HISTORY OF -       Laparoscopic     TONSILLECTOMY  1970     Social History     Social History     Marital status:      Spouse name: N/A     Number of children: N/A     Years of education: N/A     Occupational History      [employer]     Social History Main Topics     Smoking status: Never Smoker     Smokeless tobacco: Never Used     Alcohol use Yes      Comment: occasional     Drug use: No     Sexual activity: Not Asked     Other Topics Concern     None     Social History Narrative    May 21, 2018        ENVIRONMENTAL HISTORY: [place] The home is heated with a gas furnace. They does have central air conditioning. The patient's bedroom is furnished with hard vy in bedroom, allergen mattress cover and allergen pillowcase cover.  Pets inside the house include 1 dog. There is no history of  cockroach or mice infestation. There is 1 smoker in the house, smokes outside.  The house does not have a damp basement.                Review of Systems   Constitutional: Positive for activity change and fever. Negative for chills.        Due to surgeries   HENT: Positive for postnasal drip and rhinorrhea. Negative for congestion, dental problem, ear pain, facial swelling, nosebleeds, sinus pressure and sneezing.    Eyes: Negative for discharge, redness and itching.   Respiratory: Positive for chest tightness and shortness of breath. Negative for cough and wheezing.    Cardiovascular: Negative for chest pain.   Gastrointestinal: Positive for nausea. Negative for diarrhea and vomiting.   Musculoskeletal: Positive for arthralgias and joint swelling. Negative for myalgias.   Skin: Negative for rash.   Neurological: Negative for headaches.   Hematological: Negative for adenopathy.   Psychiatric/Behavioral: Negative for behavioral problems and self-injury.           Current Outpatient Prescriptions:      albuterol (PROAIR HFA/PROVENTIL HFA/VENTOLIN HFA) 108 (90 Base) MCG/ACT Inhaler, Inhale 2 puffs into the lungs every 6 hours, Disp: , Rfl:      azelastine (ASTELIN) 0.1 % spray, Spray 2 sprays into both nostrils 2 times daily as needed, Disp: 30 mL, Rfl: 3     Black Pepper-Turmeric (TURMERIC COMPLEX/BLACK PEPPER PO), Take by mouth 2 times daily, Disp: , Rfl:      cetirizine (ZYRTEC) 10 MG tablet, Take 10 mg by mouth daily, Disp: , Rfl:      DiphenhydrAMINE HCl (BENADRYL PO), Take by mouth daily as needed, Disp: , Rfl:      fexofenadine (ALLEGRA) 60 MG tablet, Take by mouth as needed, Disp: , Rfl:      fluticasone (FLONASE) 50 MCG/ACT nasal spray, Spray 1-2 sprays into both nostrils daily., Disp: 1 Package, Rfl: 11     loratadine (CLARITIN) 10 MG tablet, Take 10 mg by mouth daily, Disp: , Rfl:      ondansetron (ZOFRAN) 8 MG tablet, Take 1 tablet (8 mg) by mouth every 8 hours as needed for nausea, Disp: 9 tablet, Rfl:  "0     sennosides (SENOKOT) 8.6 MG tablet, Take 1 tablet by mouth daily as needed for constipation, Disp: , Rfl:      diclofenac (VOLTAREN) 1 % GEL, , Disp: , Rfl:      polyethylene glycol (MIRALAX) powder, 17 g, Disp: , Rfl:      UNABLE TO FIND, MEDICATION NAME: Krill oil, Disp: , Rfl:      UNABLE TO FIND, MEDICATION NAME: Ground Flaxseed, Disp: , Rfl:      UNABLE TO FIND, MEDICATION NAME: Ground Bismark seed, Disp: , Rfl:   Immunization History   Administered Date(s) Administered     Influenza (IIV3) PF 10/30/2008     TDAP Vaccine (Adacel) 03/11/2010     Allergies   Allergen Reactions     Nkda [No Known Drug Allergies]      OBJECTIVE:                                                                 /70 (BP Location: Left arm, Patient Position: Sitting, Cuff Size: Adult Regular)  Pulse 77  Temp 97.1  F (36.2  C) (Tympanic)  Resp 16  Ht 1.702 m (5' 7\")  Wt 64 kg (141 lb)  SpO2 98%  BMI 22.08 kg/m2        Physical Exam   Constitutional: She is oriented to person, place, and time. No distress.   HENT:   Head: Normocephalic and atraumatic.   Right Ear: Tympanic membrane, external ear and ear canal normal.   Left Ear: Tympanic membrane, external ear and ear canal normal.   Nose: No mucosal edema or rhinorrhea.   Mouth/Throat: Oropharynx is clear and moist and mucous membranes are normal. No oropharyngeal exudate, posterior oropharyngeal edema or posterior oropharyngeal erythema.   Eyes: Conjunctivae are normal. Right eye exhibits no discharge. Left eye exhibits no discharge.   Neck: Normal range of motion.   Cardiovascular: Normal rate, regular rhythm and normal heart sounds.    No murmur heard.  Pulmonary/Chest: Effort normal and breath sounds normal. No respiratory distress. She has no wheezes. She has no rales.   Musculoskeletal: Normal range of motion.   Neurological: She is alert and oriented to person, place, and time.   Skin: Skin is warm. No rash noted. She is not diaphoretic.   Psychiatric: Affect " normal.   Nursing note and vitals reviewed.    WORKUP:   SPIROMETRY       FVC 3.77L (103% of predicted).     FEV1 2.89L (100% of predicted).     FEV1/FVC 77%     FEF 25%-75%  2.36L/s (90% of predicted)    The office spirometry performed today does not suggest an obstruction.      Asthma Control Test (ACT) total score: 21     Percutaneous skin puncture testing for aeroallergens performed today (May 21, 2018) was negative with appropriate responses to positive and negative controls.      ASSESSMENT/PLAN:       1. Other form of dyspnea    -  Primary  Reactive airway versus dysfunctional breathing.  Bronchodilators do not seem to provide a good symptom control.  -Ordered PFTs and methacholine challenge test.  Discussed possibility of false positive results.  If negative, will refer for evaluation at [facility] clinic.    Other Relevant Orders    Spirometry, Breathing Capacity (Completed)    General PFT Lab (Please always keep checked)    Pulmonary Function Test    CBC with platelets differential    XR Chest 2 Views    2. Other chronic rhinitis    It seems to be relatively well controlled with intranasal fluticasone 1 spray in each nostril twice daily, sinus rinses as needed and diphenhydramine as needed.  -Continue as is.  For breakthrough symptoms, she may also consider azelastine 2 sprays in each nostril twice daily as needed.      Relevant Medications    azelastine (ASTELIN) 0.1 % spray         Follow up depending on the results of methacholine challenge test.  Sooner if it is needed.    Thank you for allowing us to participate in the care of this patient. Please feel free to contact us if there are any questions or concerns about the patient.    Disclaimer: This note consists of symbols derived from keyboarding, dictation and/or voice recognition software. As a result, there may be errors in the script that have gone undetected. Please consider this when interpreting information found in this chart.    Mike Greer MD,  ELIECER  Allergy, Asthma and Immunology  [facility], [place] and [place]

## 2018-05-22 ASSESSMENT — ASTHMA QUESTIONNAIRES: ACT_TOTALSCORE: 21

## 2018-06-07 ENCOUNTER — HOSPITAL ENCOUNTER (OUTPATIENT)
Dept: RESPIRATORY THERAPY | Facility: CLINIC | Age: 58
Discharge: HOME | End: 2018-06-07
Attending: INTERNAL MEDICINE | Admitting: INTERNAL MEDICINE
Payer: COMMERCIAL

## 2018-06-07 ENCOUNTER — HOSPITAL ENCOUNTER (OUTPATIENT)
Dept: GENERAL RADIOLOGY | Facility: CLINIC | Age: 58
End: 2018-06-07
Attending: ALLERGY & IMMUNOLOGY
Payer: COMMERCIAL

## 2018-06-07 DIAGNOSIS — R06.09 OTHER FORM OF DYSPNEA: ICD-10-CM

## 2018-06-07 LAB
BASOPHILS # BLD AUTO: 0 10E9/L (ref 0–0.2)
BASOPHILS NFR BLD AUTO: 0.3 %
DIFFERENTIAL METHOD BLD: NORMAL
EOSINOPHIL # BLD AUTO: 0.1 10E9/L (ref 0–0.7)
EOSINOPHIL NFR BLD AUTO: 1 %
ERYTHROCYTE [DISTWIDTH] IN BLOOD BY AUTOMATED COUNT: 12.9 % (ref 10–15)
HCT VFR BLD AUTO: 41.5 % (ref 35–47)
HGB BLD-MCNC: 13.8 G/DL (ref 11.7–15.7)
LYMPHOCYTES # BLD AUTO: 2.2 10E9/L (ref 0.8–5.3)
LYMPHOCYTES NFR BLD AUTO: 24.6 %
MCH RBC QN AUTO: 31.2 PG (ref 26.5–33)
MCHC RBC AUTO-ENTMCNC: 33.3 G/DL (ref 31.5–36.5)
MCV RBC AUTO: 94 FL (ref 78–100)
MONOCYTES # BLD AUTO: 1 10E9/L (ref 0–1.3)
MONOCYTES NFR BLD AUTO: 10.8 %
NEUTROPHILS # BLD AUTO: 5.6 10E9/L (ref 1.6–8.3)
NEUTROPHILS NFR BLD AUTO: 63.3 %
PLATELET # BLD AUTO: 254 10E9/L (ref 150–450)
RBC # BLD AUTO: 4.42 10E12/L (ref 3.8–5.2)
WBC # BLD AUTO: 8.9 10E9/L (ref 4–11)

## 2018-06-07 PROCEDURE — 85025 COMPLETE CBC W/AUTO DIFF WBC: CPT | Performed by: ALLERGY & IMMUNOLOGY

## 2018-06-07 PROCEDURE — 94729 DIFFUSING CAPACITY: CPT | Mod: 26 | Performed by: INTERNAL MEDICINE

## 2018-06-07 PROCEDURE — 25000125 ZZHC RX 250: Performed by: ALLERGY & IMMUNOLOGY

## 2018-06-07 PROCEDURE — 94060 EVALUATION OF WHEEZING: CPT

## 2018-06-07 PROCEDURE — 94729 DIFFUSING CAPACITY: CPT

## 2018-06-07 PROCEDURE — 94726 PLETHYSMOGRAPHY LUNG VOLUMES: CPT | Mod: 26 | Performed by: INTERNAL MEDICINE

## 2018-06-07 PROCEDURE — 94060 EVALUATION OF WHEEZING: CPT | Mod: 26 | Performed by: INTERNAL MEDICINE

## 2018-06-07 PROCEDURE — 71046 X-RAY EXAM CHEST 2 VIEWS: CPT | Mod: FY

## 2018-06-07 PROCEDURE — 94726 PLETHYSMOGRAPHY LUNG VOLUMES: CPT

## 2018-06-07 PROCEDURE — 36415 COLL VENOUS BLD VENIPUNCTURE: CPT | Performed by: ALLERGY & IMMUNOLOGY

## 2018-06-07 RX ORDER — ALBUTEROL SULFATE 0.83 MG/ML
2.5 SOLUTION RESPIRATORY (INHALATION) ONCE
Status: COMPLETED | OUTPATIENT
Start: 2018-06-07 | End: 2018-06-07

## 2018-06-07 RX ADMIN — ALBUTEROL SULFATE 2.5 MG: 2.5 SOLUTION RESPIRATORY (INHALATION) at 13:30

## 2018-06-08 NOTE — PROGRESS NOTES
Since October 8, 2008, heart size is normal. No pleural  effusion, pneumothorax, or abnormal area of consolidation. Lateral  oblong dense calcified nodule, a granuloma.  She may discuss granuloma with PCP.  Mike Greer

## 2018-06-13 LAB
DLCOCOR-%PRED-PRE: 114 %
DLCOCOR-PRE: 26.67 ML/MIN/MMHG
DLCOUNC-%PRED-PRE: 116 %
DLCOUNC-PRE: 26.99 ML/MIN/MMHG
DLCOUNC-PRED: 23.25 ML/MIN/MMHG
ERV-%PRED-PRE: 139 %
ERV-PRE: 1.54 L
ERV-PRED: 1.1 L
EXPTIME-PRE: 7.09 SEC
FEF2575-%PRED-POST: 105 %
FEF2575-%PRED-PRE: 104 %
FEF2575-POST: 2.75 L/SEC
FEF2575-PRE: 2.72 L/SEC
FEF2575-PRED: 2.6 L/SEC
FEFMAX-%PRED-PRE: 119 %
FEFMAX-PRE: 8.33 L/SEC
FEFMAX-PRED: 6.95 L/SEC
FEV1-%PRED-PRE: 109 %
FEV1-PRE: 3.12 L
FEV1FEV6-PRE: 79 %
FEV1FEV6-PRED: 81 %
FEV1FVC-PRE: 78 %
FEV1FVC-PRED: 79 %
FEV1SVC-PRE: 77 %
FEV1SVC-PRED: 77 %
FIFMAX-PRE: 4.31 L/SEC
FRCPLETH-%PRED-PRE: 156 %
FRCPLETH-PRE: 4.46 L
FRCPLETH-PRED: 2.86 L
FVC-%PRED-PRE: 109 %
FVC-PRE: 3.98 L
FVC-PRED: 3.63 L
IC-%PRED-PRE: 92 %
IC-PRE: 2.41 L
IC-PRED: 2.62 L
RVPLETH-%PRED-PRE: 143 %
RVPLETH-PRE: 2.84 L
RVPLETH-PRED: 1.98 L
TLCPLETH-%PRED-PRE: 126 %
TLCPLETH-PRE: 6.88 L
TLCPLETH-PRED: 5.43 L
VA-%PRED-PRE: 103 %
VA-PRE: 5.75 L
VC-%PRED-PRE: 108 %
VC-PRE: 4.03 L
VC-PRED: 3.72 L

## 2018-06-14 ENCOUNTER — HOSPITAL ENCOUNTER (OUTPATIENT)
Dept: RESPIRATORY THERAPY | Facility: CLINIC | Age: 58
Discharge: HOME | End: 2018-06-14
Attending: INTERNAL MEDICINE | Admitting: INTERNAL MEDICINE
Payer: COMMERCIAL

## 2018-06-14 PROCEDURE — 25000125 ZZHC RX 250: Performed by: ALLERGY & IMMUNOLOGY

## 2018-06-14 PROCEDURE — 95070 INHLJ BRNCL CHALLENGE TSTG: CPT

## 2018-06-14 PROCEDURE — 94070 EVALUATION OF WHEEZING: CPT | Mod: 26 | Performed by: INTERNAL MEDICINE

## 2018-06-14 PROCEDURE — 94070 EVALUATION OF WHEEZING: CPT

## 2018-06-14 RX ORDER — ALBUTEROL SULFATE 0.83 MG/ML
2.5 SOLUTION RESPIRATORY (INHALATION) ONCE
Status: COMPLETED | OUTPATIENT
Start: 2018-06-14 | End: 2018-06-14

## 2018-06-14 RX ADMIN — ALBUTEROL SULFATE 2.5 MG: 2.5 SOLUTION RESPIRATORY (INHALATION) at 10:30

## 2018-06-14 RX ADMIN — METHACHOLINE CHLORIDE 100 MG: 100 POWDER, FOR SOLUTION RESPIRATORY (INHALATION) at 09:45

## 2018-06-19 LAB
EXPTIME-%CHANGE-CHLG: -9 %
EXPTIME-CHLG: 6.69 SEC
EXPTIME-PRE: 7.36 SEC
FEF2575-%CHANGE-CHLG: -71 %
FEF2575-%PRED-CHLG: 27 %
FEF2575-%PRED-POST: 83 %
FEF2575-%PRED-PRE: 94 %
FEF2575-POST: 2.18 L/SEC
FEF2575-PRE: 2.47 L/SEC
FEF2575-PRED: 2.6 L/SEC
FEFMAX-%PRED-PRE: 113 %
FEFMAX-PRE: 7.92 L/SEC
FEFMAX-PRED: 6.95 L/SEC
FEV1-%PRED-PRE: 104 %
FEV1-PRE: 2.98 L
FEV1FEV6-PRE: 77 %
FEV1FEV6-PRED: 81 %
FEV1FVC-PRE: 76 %
FEV1FVC-PRED: 79 %
FIFMAX-%CHANGE-CHLG: 1 %
FIFMAX-CHLG: 3.9 L/SEC
FIFMAX-PRE: 3.86 L/SEC
FVC-%PRED-PRE: 107 %
FVC-PRE: 3.9 L
FVC-PRED: 3.63 L

## 2018-06-21 NOTE — PROGRESS NOTES
Normal PFTs.  Methacholine challenge test showed decrease in FEV1 by 22% after 10 mg/mL.  Positive methacholine challenge test does not confirm asthma, it just does not argue against it.  In the past, she reported that her symptoms did not get better with CHON (albuterol) and Symbicort.  We can either try a combination of a different LABA/ICS, or I would consider referral to voice clinic for dysfunctional breathing evaluation.    Mike Nunezerea

## 2018-06-22 DIAGNOSIS — R06.09 OTHER FORM OF DYSPNEA: Primary | ICD-10-CM

## 2020-09-16 ENCOUNTER — HOSPITAL ENCOUNTER (OUTPATIENT)
Dept: MAMMOGRAPHY | Facility: CLINIC | Age: 60
Discharge: HOME | End: 2020-09-16
Attending: FAMILY MEDICINE | Admitting: FAMILY MEDICINE
Payer: COMMERCIAL

## 2020-09-16 DIAGNOSIS — Z12.31 VISIT FOR SCREENING MAMMOGRAM: ICD-10-CM

## 2020-09-16 PROCEDURE — 77067 SCR MAMMO BI INCL CAD: CPT

## 2021-02-18 DIAGNOSIS — J02.9 SORE THROAT: Primary | ICD-10-CM

## 2021-02-18 DIAGNOSIS — Z20.822 SUSPECTED COVID-19 VIRUS INFECTION: ICD-10-CM

## 2021-02-20 DIAGNOSIS — J02.9 SORE THROAT: ICD-10-CM

## 2021-02-20 DIAGNOSIS — Z20.822 SUSPECTED COVID-19 VIRUS INFECTION: ICD-10-CM

## 2021-02-20 LAB
DEPRECATED S PYO AG THROAT QL EIA: NEGATIVE
SARS-COV-2 RNA RESP QL NAA+PROBE: NORMAL
SPECIMEN SOURCE: NORMAL
STREP GROUP A PCR: NOT DETECTED

## 2021-02-20 PROCEDURE — U0003 INFECTIOUS AGENT DETECTION BY NUCLEIC ACID (DNA OR RNA); SEVERE ACUTE RESPIRATORY SYNDROME CORONAVIRUS 2 (SARS-COV-2) (CORONAVIRUS DISEASE [COVID-19]), AMPLIFIED PROBE TECHNIQUE, MAKING USE OF HIGH THROUGHPUT TECHNOLOGIES AS DESCRIBED BY CMS-2020-01-R: HCPCS | Performed by: FAMILY MEDICINE

## 2021-02-20 PROCEDURE — 87651 STREP A DNA AMP PROBE: CPT | Performed by: FAMILY MEDICINE

## 2021-02-20 PROCEDURE — 99N1174 PR STATISTIC STREP A RAPID: Performed by: FAMILY MEDICINE

## 2021-02-20 PROCEDURE — U0005 INFEC AGEN DETEC AMPLI PROBE: HCPCS | Performed by: FAMILY MEDICINE

## 2021-02-21 LAB
LABORATORY COMMENT REPORT: NORMAL
SARS-COV-2 RNA RESP QL NAA+PROBE: NEGATIVE
SPECIMEN SOURCE: NORMAL

## 2021-03-13 ENCOUNTER — HEALTH MAINTENANCE LETTER (OUTPATIENT)
Age: 61
End: 2021-03-13

## 2021-10-23 ENCOUNTER — HEALTH MAINTENANCE LETTER (OUTPATIENT)
Age: 61
End: 2021-10-23

## 2022-04-09 ENCOUNTER — HEALTH MAINTENANCE LETTER (OUTPATIENT)
Age: 62
End: 2022-04-09

## 2022-10-09 ENCOUNTER — HEALTH MAINTENANCE LETTER (OUTPATIENT)
Age: 62
End: 2022-10-09

## 2022-11-26 ENCOUNTER — HEALTH MAINTENANCE LETTER (OUTPATIENT)
Age: 62
End: 2022-11-26

## 2023-05-21 ENCOUNTER — HEALTH MAINTENANCE LETTER (OUTPATIENT)
Age: 63
End: 2023-05-21

## 2024-08-12 ENCOUNTER — HOSPITAL ENCOUNTER (EMERGENCY)
Facility: CLINIC | Age: 64
Discharge: HOME | End: 2024-08-12
Payer: COMMERCIAL

## 2024-08-12 ENCOUNTER — APPOINTMENT (OUTPATIENT)
Dept: GENERAL RADIOLOGY | Facility: CLINIC | Age: 64
End: 2024-08-12
Payer: COMMERCIAL

## 2024-08-12 VITALS
SYSTOLIC BLOOD PRESSURE: 132 MMHG | DIASTOLIC BLOOD PRESSURE: 75 MMHG | TEMPERATURE: 97.3 F | OXYGEN SATURATION: 100 % | HEART RATE: 72 BPM | RESPIRATION RATE: 28 BRPM

## 2024-08-12 DIAGNOSIS — S63.501A SPRAIN OF RIGHT WRIST, INITIAL ENCOUNTER: ICD-10-CM

## 2024-08-12 PROCEDURE — G0463 HOSPITAL OUTPT CLINIC VISIT: HCPCS

## 2024-08-12 PROCEDURE — 99203 OFFICE O/P NEW LOW 30 MIN: CPT

## 2024-08-12 PROCEDURE — 73110 X-RAY EXAM OF WRIST: CPT | Mod: RT

## 2024-08-12 ASSESSMENT — ACTIVITIES OF DAILY LIVING (ADL): ADLS_ACUITY_SCORE: 35

## 2024-08-12 ASSESSMENT — COLUMBIA-SUICIDE SEVERITY RATING SCALE - C-SSRS
6. HAVE YOU EVER DONE ANYTHING, STARTED TO DO ANYTHING, OR PREPARED TO DO ANYTHING TO END YOUR LIFE?: NO
2. HAVE YOU ACTUALLY HAD ANY THOUGHTS OF KILLING YOURSELF IN THE PAST MONTH?: NO
1. IN THE PAST MONTH, HAVE YOU WISHED YOU WERE DEAD OR WISHED YOU COULD GO TO SLEEP AND NOT WAKE UP?: NO

## 2024-08-13 NOTE — ED PROVIDER NOTES
History     Chief Complaint   Patient presents with    Wrist Pain     Patient fell and injured right wrist   Onset today      HPI  Maribel Gavin is a 64 year old female who presents for evaluation of right wrist pain that occurred after an injury today.  Was gardening and lost her balance, fell backwards and caught the fall with right wrist.  Has been noting increased pain along the volar lateral wrist extending up towards the proximal forearm.  She does note she has baseline pain in this area due to arthritis but pain feels worse following the injury.  No other injuries or areas of acute pain.  Denies changes to distal sensation or motor function.  Has not tried anything for symptoms yet.    Allergies:  Allergies   Allergen Reactions    Nkda [No Known Drug Allergy]        Problem List:    Patient Active Problem List    Diagnosis Date Noted    Allergic rhinitis 07/23/2012     Priority: Medium    Intermittent asthma 07/23/2012     Priority: Medium    Osteoarthritis 06/14/2012     Priority: Medium    CARDIOVASCULAR SCREENING; LDL GOAL LESS THAN 160 10/31/2010     Priority: Medium    Neck pain 03/11/2010     Priority: Medium        Past Medical History:    Past Medical History:   Diagnosis Date    Degenerative arthritis        Past Surgical History:    Past Surgical History:   Procedure Laterality Date    COLONOSCOPY  3/7/2013    Procedure: COLONOSCOPY;  Colonoscopy;  Surgeon: Anupam Lopez MD;  Location: [facility]    COLONOSCOPY N/A 9/29/2016    Procedure: COLONOSCOPY;  Surgeon: Anupam Lopez MD;  Location: [facility]    ESOPHAGOSCOPY, GASTROSCOPY, DUODENOSCOPY (EGD), COMBINED N/A 12/11/2017    Procedure: COMBINED ESOPHAGOSCOPY, GASTROSCOPY, DUODENOSCOPY (EGD), BIOPSY SINGLE OR MULTIPLE;  Gastroscopy  ;  Surgeon: Anupam Lopez MD;  Location: [facility]    FOOT SURGERY      HYSTERECTOMY, VAGINAL  2008    fibroids    knuckle replacements      SURGICAL HISTORY OF -   1995    Laparoscopic    TONSILLECTOMY  1970        Family History:    Family History   Problem Relation Age of Onset    Cardiovascular Paternal Grandmother          of an MI in her 80's; first MI in her 40's    Cerebrovascular Disease Paternal Grandmother     Cardiovascular Paternal Uncle         3 uncles with MI's and ASHD    Lipids Father         High chol    Lipids Mother         high chol    Macular Degeneration Mother     Hypertension Paternal Uncle        Social History:  Marital Status:   [2]  Social History     Tobacco Use    Smoking status: Never    Smokeless tobacco: Never   Substance Use Topics    Alcohol use: Yes     Comment: occasional    Drug use: No        Medications:    albuterol (PROAIR HFA/PROVENTIL HFA/VENTOLIN HFA) 108 (90 Base) MCG/ACT Inhaler  azelastine (ASTELIN) 0.1 % spray  Black Pepper-Turmeric (TURMERIC COMPLEX/BLACK PEPPER PO)  cetirizine (ZYRTEC) 10 MG tablet  diclofenac (VOLTAREN) 1 % GEL  DiphenhydrAMINE HCl (BENADRYL PO)  fexofenadine (ALLEGRA) 60 MG tablet  fluticasone (FLONASE) 50 MCG/ACT nasal spray  loratadine (CLARITIN) 10 MG tablet  ondansetron (ZOFRAN) 8 MG tablet  polyethylene glycol (MIRALAX) powder  sennosides (SENOKOT) 8.6 MG tablet  UNABLE TO FIND  UNABLE TO FIND  UNABLE TO FIND          Review of Systems  Pertinent review of systems as documented per HPI above.    Physical Exam   BP: 132/75  Pulse: 72  Temp: 97.3  F (36.3  C)  Resp: 28  SpO2: 100 %      Physical Exam  Vitals and nursing note reviewed.   Constitutional:       General: She is not in acute distress.     Appearance: Normal appearance. She is not ill-appearing, toxic-appearing or diaphoretic.   HENT:      Head: Atraumatic.   Cardiovascular:      Rate and Rhythm: Normal rate.   Pulmonary:      Effort: Pulmonary effort is normal. No respiratory distress.   Musculoskeletal:      Right forearm: Tenderness present. No swelling, edema, deformity, lacerations or bony tenderness.      Right wrist: Swelling and tenderness present. No deformity,  effusion, lacerations, bony tenderness, snuff box tenderness or crepitus. Decreased range of motion. Normal pulse.      Right hand: Normal. No swelling, deformity, lacerations, tenderness or bony tenderness. Normal range of motion. Normal strength. Normal sensation. Normal capillary refill. Normal pulse.   Skin:     General: Skin is warm and dry.      Capillary Refill: Capillary refill takes less than 2 seconds.   Neurological:      General: No focal deficit present.      Mental Status: She is alert and oriented to person, place, and time.   Psychiatric:         Mood and Affect: Mood normal.         Behavior: Behavior normal.         ED Course       Results for orders placed or performed during the hospital encounter of 08/12/24 (from the past 24 hour(s))   XR Wrist Right G/E 3 Views    Narrative    EXAM: XR WRIST RIGHT G/E 3 VIEWS  LOCATION: [facility]  DATE: 8/12/2024    INDICATION: Fall, evaluate for fracture, pain  COMPARISON: None.      Impression    IMPRESSION: No acute fracture or malalignment. Moderate first CMC joint degenerative changes. Osteopenia.       Medications - No data to display    Assessments & Plan (with Medical Decision Making)     I have reviewed the nursing notes.    I have reviewed the findings, diagnosis, plan and need for follow up with the patient.  64-year-old female who presents for evaluation of a right wrist injury that occurred today.  She was gardening when she lost her balance and fell backwards, catching fall along her right wrist.  Now has pain along the lateral volar right wrist extending towards proximal forearm.  Has not tried anything for symptoms yet.  Patient afebrile on arrival with VS WNL.  Exam above.  X-ray ordered to evaluate for acute fracture or dislocation, I independently reviewed this and agree with radiology interpretation above.  Concern for acute right wrist sprain, offered wrist brace for comfort, patient reports she has one at home  and will use this.  Recommend RICE and oral analgesics as needed for pain management, advised following up with orthopedics or PCP if pain ongoing.  Discussed signs that would warrant return to UC/ED.  All questions answered.  Patient verbalizes understanding and agreement with the above plan.  Patient discharged in stable condition.    Disclaimer: This note consists of symbols derived from keyboarding, dictation, and/or voice recognition software. As a result, there may be errors in the script that have gone undetected.  Please consider this when interpreting information found in the chart.        Discharge Medication List as of 8/12/2024  7:33 PM          Final diagnoses:   Sprain of right wrist, initial encounter       8/12/2024   [facility] EMERGENCY DEPT       Brandi Kam PA-C  08/13/24 [id number]

## 2025-05-24 ENCOUNTER — HEALTH MAINTENANCE LETTER (OUTPATIENT)
Age: 65
End: 2025-05-24